# Patient Record
Sex: FEMALE | Race: WHITE | NOT HISPANIC OR LATINO | Employment: UNEMPLOYED | URBAN - METROPOLITAN AREA
[De-identification: names, ages, dates, MRNs, and addresses within clinical notes are randomized per-mention and may not be internally consistent; named-entity substitution may affect disease eponyms.]

---

## 2017-12-27 ENCOUNTER — APPOINTMENT (INPATIENT)
Dept: NON INVASIVE DIAGNOSTICS | Facility: HOSPITAL | Age: 62
DRG: 065 | End: 2017-12-27
Payer: MEDICARE

## 2017-12-27 ENCOUNTER — APPOINTMENT (INPATIENT)
Dept: MRI IMAGING | Facility: HOSPITAL | Age: 62
DRG: 065 | End: 2017-12-27
Payer: MEDICARE

## 2017-12-27 ENCOUNTER — APPOINTMENT (EMERGENCY)
Dept: CT IMAGING | Facility: HOSPITAL | Age: 62
DRG: 065 | End: 2017-12-27
Payer: MEDICARE

## 2017-12-27 ENCOUNTER — HOSPITAL ENCOUNTER (INPATIENT)
Facility: HOSPITAL | Age: 62
LOS: 2 days | Discharge: HOME | DRG: 065 | End: 2017-12-29
Attending: INTERNAL MEDICINE | Admitting: INTERNAL MEDICINE
Payer: MEDICARE

## 2017-12-27 DIAGNOSIS — R29.898 WEAKNESS OF LEFT ARM: Primary | ICD-10-CM

## 2017-12-27 DIAGNOSIS — R20.0 NUMBNESS: ICD-10-CM

## 2017-12-27 DIAGNOSIS — E11.9 TYPE 2 DIABETES MELLITUS (HCC): ICD-10-CM

## 2017-12-27 DIAGNOSIS — E66.01 MORBID OBESITY (HCC): ICD-10-CM

## 2017-12-27 DIAGNOSIS — I63.9 CEREBROVASCULAR ACCIDENT (CVA), UNSPECIFIED MECHANISM (HCC): ICD-10-CM

## 2017-12-27 PROBLEM — I10 HYPERTENSION: Status: ACTIVE | Noted: 2017-12-27

## 2017-12-27 PROBLEM — J44.9 COPD (CHRONIC OBSTRUCTIVE PULMONARY DISEASE) (HCC): Status: ACTIVE | Noted: 2017-12-27

## 2017-12-27 PROBLEM — E03.9 HYPOTHYROIDISM: Status: ACTIVE | Noted: 2017-12-27

## 2017-12-27 PROBLEM — E78.5 HYPERLIPIDEMIA: Status: ACTIVE | Noted: 2017-12-27

## 2017-12-27 LAB
ALBUMIN SERPL BCP-MCNC: 3.2 G/DL (ref 3.5–5)
ALP SERPL-CCNC: 132 U/L (ref 46–116)
ALT SERPL W P-5'-P-CCNC: 30 U/L (ref 12–78)
ANION GAP SERPL CALCULATED.3IONS-SCNC: 6 MMOL/L (ref 4–13)
APTT PPP: 32 SECONDS (ref 23–35)
AST SERPL W P-5'-P-CCNC: 25 U/L (ref 5–45)
ATRIAL RATE: 85 BPM
BACTERIA UR QL AUTO: ABNORMAL /HPF
BASOPHILS # BLD AUTO: 0.02 THOUSANDS/ΜL (ref 0–0.1)
BASOPHILS NFR BLD AUTO: 0 % (ref 0–1)
BILIRUB SERPL-MCNC: 0.4 MG/DL (ref 0.2–1)
BILIRUB UR QL STRIP: NEGATIVE
BUN SERPL-MCNC: 12 MG/DL (ref 5–25)
CALCIUM SERPL-MCNC: 9.2 MG/DL (ref 8.3–10.1)
CHLORIDE SERPL-SCNC: 104 MMOL/L (ref 100–108)
CLARITY UR: CLEAR
CO2 SERPL-SCNC: 31 MMOL/L (ref 21–32)
COLOR UR: YELLOW
CREAT SERPL-MCNC: 0.89 MG/DL (ref 0.6–1.3)
EOSINOPHIL # BLD AUTO: 0.13 THOUSAND/ΜL (ref 0–0.61)
EOSINOPHIL NFR BLD AUTO: 2 % (ref 0–6)
ERYTHROCYTE [DISTWIDTH] IN BLOOD BY AUTOMATED COUNT: 15.5 % (ref 11.6–15.1)
GFR SERPL CREATININE-BSD FRML MDRD: 70 ML/MIN/1.73SQ M
GLUCOSE SERPL-MCNC: 209 MG/DL (ref 65–140)
GLUCOSE SERPL-MCNC: 234 MG/DL (ref 65–140)
GLUCOSE UR STRIP-MCNC: NEGATIVE MG/DL
HCT VFR BLD AUTO: 38.9 % (ref 34.8–46.1)
HGB BLD-MCNC: 11.5 G/DL (ref 11.5–15.4)
HGB UR QL STRIP.AUTO: NEGATIVE
INR PPP: 0.94 (ref 0.86–1.16)
KETONES UR STRIP-MCNC: NEGATIVE MG/DL
LEUKOCYTE ESTERASE UR QL STRIP: NEGATIVE
LYMPHOCYTES # BLD AUTO: 1.27 THOUSANDS/ΜL (ref 0.6–4.47)
LYMPHOCYTES NFR BLD AUTO: 18 % (ref 14–44)
MCH RBC QN AUTO: 23.8 PG (ref 26.8–34.3)
MCHC RBC AUTO-ENTMCNC: 29.6 G/DL (ref 31.4–37.4)
MCV RBC AUTO: 80 FL (ref 82–98)
MONOCYTES # BLD AUTO: 0.65 THOUSAND/ΜL (ref 0.17–1.22)
MONOCYTES NFR BLD AUTO: 9 % (ref 4–12)
NEUTROPHILS # BLD AUTO: 4.87 THOUSANDS/ΜL (ref 1.85–7.62)
NEUTS SEG NFR BLD AUTO: 70 % (ref 43–75)
NITRITE UR QL STRIP: POSITIVE
NON-SQ EPI CELLS URNS QL MICRO: ABNORMAL /HPF
NRBC BLD AUTO-RTO: 0 /100 WBCS
P AXIS: 72 DEGREES
PH UR STRIP.AUTO: 6 [PH] (ref 4.5–8)
PLATELET # BLD AUTO: 237 THOUSANDS/UL (ref 149–390)
PLATELET # BLD AUTO: 261 THOUSANDS/UL (ref 149–390)
PMV BLD AUTO: 10.3 FL (ref 8.9–12.7)
PMV BLD AUTO: 10.7 FL (ref 8.9–12.7)
POTASSIUM SERPL-SCNC: 4.8 MMOL/L (ref 3.5–5.3)
PR INTERVAL: 188 MS
PROT SERPL-MCNC: 6.8 G/DL (ref 6.4–8.2)
PROT UR STRIP-MCNC: NEGATIVE MG/DL
PROTHROMBIN TIME: 12.8 SECONDS (ref 12.1–14.4)
QRS AXIS: 72 DEGREES
QRSD INTERVAL: 76 MS
QT INTERVAL: 360 MS
QTC INTERVAL: 428 MS
RBC # BLD AUTO: 4.84 MILLION/UL (ref 3.81–5.12)
RBC #/AREA URNS AUTO: ABNORMAL /HPF
SODIUM SERPL-SCNC: 141 MMOL/L (ref 136–145)
SP GR UR STRIP.AUTO: 1.01 (ref 1–1.03)
T WAVE AXIS: 63 DEGREES
TROPONIN I SERPL-MCNC: <0.02 NG/ML
TSH SERPL DL<=0.05 MIU/L-ACNC: 1.49 UIU/ML (ref 0.36–3.74)
UROBILINOGEN UR QL STRIP.AUTO: 0.2 E.U./DL
VENTRICULAR RATE: 85 BPM
WBC # BLD AUTO: 6.98 THOUSAND/UL (ref 4.31–10.16)
WBC #/AREA URNS AUTO: ABNORMAL /HPF

## 2017-12-27 PROCEDURE — 85610 PROTHROMBIN TIME: CPT | Performed by: NURSE PRACTITIONER

## 2017-12-27 PROCEDURE — 81001 URINALYSIS AUTO W/SCOPE: CPT | Performed by: GENERAL PRACTICE

## 2017-12-27 PROCEDURE — 80053 COMPREHEN METABOLIC PANEL: CPT | Performed by: INTERNAL MEDICINE

## 2017-12-27 PROCEDURE — 84484 ASSAY OF TROPONIN QUANT: CPT | Performed by: INTERNAL MEDICINE

## 2017-12-27 PROCEDURE — 84443 ASSAY THYROID STIM HORMONE: CPT | Performed by: GENERAL PRACTICE

## 2017-12-27 PROCEDURE — 93306 TTE W/DOPPLER COMPLETE: CPT

## 2017-12-27 PROCEDURE — 99285 EMERGENCY DEPT VISIT HI MDM: CPT

## 2017-12-27 PROCEDURE — 93005 ELECTROCARDIOGRAM TRACING: CPT | Performed by: INTERNAL MEDICINE

## 2017-12-27 PROCEDURE — 85025 COMPLETE CBC W/AUTO DIFF WBC: CPT | Performed by: INTERNAL MEDICINE

## 2017-12-27 PROCEDURE — 85730 THROMBOPLASTIN TIME PARTIAL: CPT | Performed by: NURSE PRACTITIONER

## 2017-12-27 PROCEDURE — 36415 COLL VENOUS BLD VENIPUNCTURE: CPT | Performed by: GENERAL PRACTICE

## 2017-12-27 PROCEDURE — 85049 AUTOMATED PLATELET COUNT: CPT | Performed by: GENERAL PRACTICE

## 2017-12-27 PROCEDURE — 70450 CT HEAD/BRAIN W/O DYE: CPT

## 2017-12-27 PROCEDURE — 93005 ELECTROCARDIOGRAM TRACING: CPT

## 2017-12-27 PROCEDURE — 70551 MRI BRAIN STEM W/O DYE: CPT

## 2017-12-27 PROCEDURE — 36415 COLL VENOUS BLD VENIPUNCTURE: CPT

## 2017-12-27 PROCEDURE — 70544 MR ANGIOGRAPHY HEAD W/O DYE: CPT

## 2017-12-27 PROCEDURE — 82948 REAGENT STRIP/BLOOD GLUCOSE: CPT

## 2017-12-27 RX ORDER — SIMVASTATIN 40 MG
40 TABLET ORAL
COMMUNITY
End: 2020-07-16 | Stop reason: SDUPTHER

## 2017-12-27 RX ORDER — ATORVASTATIN CALCIUM 40 MG/1
40 TABLET, FILM COATED ORAL EVERY EVENING
Status: DISCONTINUED | OUTPATIENT
Start: 2017-12-27 | End: 2017-12-29 | Stop reason: HOSPADM

## 2017-12-27 RX ORDER — POTASSIUM CHLORIDE 20 MEQ/1
20 TABLET, EXTENDED RELEASE ORAL DAILY
COMMUNITY

## 2017-12-27 RX ORDER — HYDRALAZINE HYDROCHLORIDE 20 MG/ML
10 INJECTION INTRAMUSCULAR; INTRAVENOUS EVERY 6 HOURS PRN
Status: DISCONTINUED | OUTPATIENT
Start: 2017-12-27 | End: 2017-12-29 | Stop reason: HOSPADM

## 2017-12-27 RX ORDER — MELATONIN
1000 DAILY
COMMUNITY

## 2017-12-27 RX ORDER — ASPIRIN 81 MG/1
81 TABLET ORAL DAILY
COMMUNITY
End: 2017-12-29 | Stop reason: HOSPADM

## 2017-12-27 RX ORDER — MELOXICAM 7.5 MG/1
7.5 TABLET ORAL DAILY
COMMUNITY
End: 2019-05-21 | Stop reason: ALTCHOICE

## 2017-12-27 RX ORDER — GEMFIBROZIL 600 MG/1
600 TABLET, FILM COATED ORAL
COMMUNITY

## 2017-12-27 RX ORDER — BISOPROLOL FUMARATE AND HYDROCHLOROTHIAZIDE 10; 6.25 MG/1; MG/1
1 TABLET ORAL DAILY
COMMUNITY
End: 2018-02-27

## 2017-12-27 RX ORDER — BISOPROLOL FUMARATE AND HYDROCHLOROTHIAZIDE 10; 6.25 MG/1; MG/1
1 TABLET ORAL DAILY
Status: DISCONTINUED | OUTPATIENT
Start: 2017-12-27 | End: 2017-12-27 | Stop reason: SDUPTHER

## 2017-12-27 RX ORDER — ASPIRIN 81 MG/1
81 TABLET, CHEWABLE ORAL DAILY
Status: DISCONTINUED | OUTPATIENT
Start: 2017-12-27 | End: 2017-12-27

## 2017-12-27 RX ORDER — POTASSIUM CHLORIDE 20 MEQ/1
20 TABLET, EXTENDED RELEASE ORAL DAILY
Status: DISCONTINUED | OUTPATIENT
Start: 2017-12-27 | End: 2017-12-29 | Stop reason: HOSPADM

## 2017-12-27 RX ORDER — LEVOTHYROXINE SODIUM 0.15 MG/1
150 TABLET ORAL DAILY
Status: DISCONTINUED | OUTPATIENT
Start: 2017-12-27 | End: 2017-12-29 | Stop reason: HOSPADM

## 2017-12-27 RX ORDER — GLIMEPIRIDE 4 MG/1
4 TABLET ORAL
COMMUNITY
End: 2018-02-27

## 2017-12-27 RX ORDER — ALBUTEROL SULFATE 90 UG/1
2 AEROSOL, METERED RESPIRATORY (INHALATION) EVERY 6 HOURS PRN
COMMUNITY

## 2017-12-27 RX ORDER — ASPIRIN 81 MG/1
324 TABLET, CHEWABLE ORAL DAILY
Status: DISCONTINUED | OUTPATIENT
Start: 2017-12-28 | End: 2017-12-27

## 2017-12-27 RX ORDER — FUROSEMIDE 20 MG/1
20 TABLET ORAL 2 TIMES DAILY
Status: DISCONTINUED | OUTPATIENT
Start: 2017-12-27 | End: 2017-12-29 | Stop reason: HOSPADM

## 2017-12-27 RX ORDER — UREA 10 %
500 LOTION (ML) TOPICAL 2 TIMES DAILY
Status: DISCONTINUED | OUTPATIENT
Start: 2017-12-27 | End: 2017-12-29 | Stop reason: HOSPADM

## 2017-12-27 RX ORDER — ALBUTEROL SULFATE 90 UG/1
2 AEROSOL, METERED RESPIRATORY (INHALATION) EVERY 6 HOURS PRN
Status: DISCONTINUED | OUTPATIENT
Start: 2017-12-27 | End: 2017-12-29 | Stop reason: HOSPADM

## 2017-12-27 RX ORDER — ASPIRIN 325 MG
325 TABLET ORAL DAILY
Status: DISCONTINUED | OUTPATIENT
Start: 2017-12-27 | End: 2017-12-29 | Stop reason: HOSPADM

## 2017-12-27 RX ORDER — MELATONIN
1000 DAILY
Status: DISCONTINUED | OUTPATIENT
Start: 2017-12-27 | End: 2017-12-29 | Stop reason: HOSPADM

## 2017-12-27 RX ORDER — FLUTICASONE PROPIONATE 50 MCG
1 SPRAY, SUSPENSION (ML) NASAL DAILY PRN
COMMUNITY

## 2017-12-27 RX ORDER — LEVOTHYROXINE SODIUM 0.12 MG/1
125 TABLET ORAL DAILY
COMMUNITY

## 2017-12-27 RX ORDER — FAMOTIDINE 20 MG/1
20 TABLET, FILM COATED ORAL 2 TIMES DAILY
Status: DISCONTINUED | OUTPATIENT
Start: 2017-12-27 | End: 2017-12-29 | Stop reason: HOSPADM

## 2017-12-27 RX ORDER — HEPARIN SODIUM 5000 [USP'U]/ML
5000 INJECTION, SOLUTION INTRAVENOUS; SUBCUTANEOUS EVERY 8 HOURS SCHEDULED
Status: DISCONTINUED | OUTPATIENT
Start: 2017-12-27 | End: 2017-12-29 | Stop reason: HOSPADM

## 2017-12-27 RX ORDER — ONDANSETRON 2 MG/ML
4 INJECTION INTRAMUSCULAR; INTRAVENOUS EVERY 6 HOURS PRN
Status: DISCONTINUED | OUTPATIENT
Start: 2017-12-27 | End: 2017-12-29 | Stop reason: HOSPADM

## 2017-12-27 RX ORDER — HYDROCHLOROTHIAZIDE 12.5 MG/1
6.25 TABLET ORAL DAILY
Status: DISCONTINUED | OUTPATIENT
Start: 2017-12-27 | End: 2017-12-29 | Stop reason: HOSPADM

## 2017-12-27 RX ORDER — FUROSEMIDE 20 MG/1
20 TABLET ORAL DAILY PRN
COMMUNITY

## 2017-12-27 RX ORDER — RANITIDINE 150 MG/1
150 TABLET ORAL 2 TIMES DAILY
COMMUNITY

## 2017-12-27 RX ORDER — ACETAMINOPHEN 325 MG/1
650 TABLET ORAL EVERY 4 HOURS PRN
Status: DISCONTINUED | OUTPATIENT
Start: 2017-12-27 | End: 2017-12-29 | Stop reason: HOSPADM

## 2017-12-27 RX ORDER — BISOPROLOL FUMARATE 5 MG/1
10 TABLET ORAL DAILY
Status: DISCONTINUED | OUTPATIENT
Start: 2017-12-27 | End: 2017-12-29 | Stop reason: HOSPADM

## 2017-12-27 RX ORDER — MONTELUKAST SODIUM 10 MG/1
10 TABLET ORAL
Status: DISCONTINUED | OUTPATIENT
Start: 2017-12-27 | End: 2017-12-29 | Stop reason: HOSPADM

## 2017-12-27 RX ORDER — MONTELUKAST SODIUM 10 MG/1
10 TABLET ORAL
COMMUNITY

## 2017-12-27 RX ORDER — UREA 10 %
500 LOTION (ML) TOPICAL 2 TIMES DAILY
COMMUNITY

## 2017-12-27 RX ADMIN — Medication 500 MG: at 18:51

## 2017-12-27 RX ADMIN — ASPIRIN 325 MG: 325 TABLET ORAL at 16:07

## 2017-12-27 RX ADMIN — FAMOTIDINE 20 MG: 20 TABLET, FILM COATED ORAL at 18:51

## 2017-12-27 RX ADMIN — INSULIN LISPRO 1 UNITS: 100 INJECTION, SOLUTION INTRAVENOUS; SUBCUTANEOUS at 21:49

## 2017-12-27 RX ADMIN — MONTELUKAST SODIUM 10 MG: 10 TABLET, FILM COATED ORAL at 21:48

## 2017-12-27 RX ADMIN — ATORVASTATIN CALCIUM 40 MG: 40 TABLET, FILM COATED ORAL at 18:51

## 2017-12-27 RX ADMIN — FLUTICASONE PROPIONATE AND SALMETEROL 1 PUFF: 50; 250 POWDER RESPIRATORY (INHALATION) at 21:49

## 2017-12-27 RX ADMIN — FUROSEMIDE 20 MG: 20 TABLET ORAL at 18:51

## 2017-12-27 RX ADMIN — HEPARIN SODIUM 5000 UNITS: 5000 INJECTION, SOLUTION INTRAVENOUS; SUBCUTANEOUS at 21:48

## 2017-12-27 RX ADMIN — HEPARIN SODIUM 5000 UNITS: 5000 INJECTION, SOLUTION INTRAVENOUS; SUBCUTANEOUS at 14:48

## 2017-12-27 NOTE — CONSULTS
Consultation - Neurology   Lupe Walter 58 y o  female MRN: [id number]  Unit/Bed#: ED 07 Encounter: [id number]      Physician Requesting Consult: Leandra Harrison MD  Reason for Consult:  Left arm weakness  Hx and PE limited by:  None    HPI: Lupe Walter is a right handed  58 y o  female who  was in her usual state of health until the middle of the night last night she woke up and felt her left upper extremity felt weak and numb  The patient thought she slept on 1 side and decided to go back to sleep  Patient woke up this morning with persistent symptoms of weakness and numbness affecting the left upper extremity not associated with any facial weakness or left lower extremity weakness  The patient also denies any headaches blurred vision diplopia perioral numbness vertigo or dizziness or any speech difficulty or gait dysfunction  No history of any similar symptoms in the past patient decided to come to the emergency room today for further evaluation of the left arm numbness and weakness  At baseline she has a history of hypertension diabetes and hyperlipidemia does not smoke or drink  She is disabled from COPD  Monia Le Review of Systems:  See history of present illness for pertinent neurological symptoms  All other systems are negative  Historical Information   Past Medical History:   Diagnosis Date    [facility]     [facility] [place]     Disease of thyroid gland     Hyperlipidemia     Hypertension      Past Surgical History:   Procedure Laterality Date    APPENDECTOMY      CHOLECYSTECTOMY      HYSTERECTOMY      TONSILLECTOMY       Social History   History   Smoking Status    Former Smoker   Smokeless Tobacco    Never Used     History   Alcohol Use No     History   Drug Use No       Family History:   History reviewed  No pertinent family history      No Known Allergies    Meds:  All current active meds have been reviewed    Scheduled Meds:  aspirin 325 mg Oral Daily   atorvastatin 40 mg Oral QPM   bisoprolol 10 mg Oral Daily   And      hydrochlorothiazide 6 25 mg Oral Daily   cholecalciferol 1,000 Units Oral Daily   famotidine 20 mg Oral BID   fluticasone-salmeterol 1 puff Inhalation Q12H STUART   furosemide 20 mg Oral BID   heparin (porcine) 5,000 Units Subcutaneous Q8H [address]   insulin lispro 1-5 Units Subcutaneous TID AC   insulin lispro 1-5 Units Subcutaneous HS   levothyroxine 150 mcg Oral Daily   Liraglutide 1 2 mg Subcutaneous Daily   magnesium gluconate 500 mg Oral BID   montelukast 10 mg Oral HS   potassium chloride 20 mEq Oral Daily     PRN Meds:   acetaminophen    albuterol    hydrALAZINE    ondansetron    Physical Exam:   Objective   Vitals:   Vitals:    12/27/17 1626   BP: (!) 175/82   Pulse: 81   Resp: 16   Temp:    SpO2: 99%   ,Body mass index is 39 27 kg/m²  Patient was examined in emergency department bed  General appearance: Cooperative in no acute distress  Head & neck head is atraumatic and normocephalic  Neck is supple with full range of motion  Cardiovascular: Carotid arteriesno carotid bruits  Neurologic:   Patient is alert awake oriented, high functions are intact, speech is fluent  No evidence of any aphasia or dysarthria  Cranial nerve examination reveals visual fields are full to threat, pupils equal and reactive, extraocular movements intact, fundi showed sharp disc margins, sensation in the V1 V2 V3 distribution is symmetric, no obvious facial asymmetry noted, tongue is midline and gag is adequate  Motor examination reveals normal tone and bulk, patient has evidence of a left upper extremity pronator drift with diminished fine finger movements, minimal weakness is noted in the left triceps, 5-/5 range, strength in the lower extremities is preserved  Deep tendon reflexes are hypoactive throughout and toes are bilaterally upgoing      Sensory examination to pinprick light touch proprioception and vibration is preserved bilaterally, patient does not extinguish double simultaneous stimuli  Coordination mild dysmetria is noted on left finger-to-nose examination in proportion to the motor weakness  Gait could not be evaluated  Lab Results:Lab Results:   I have personally reviewed pertinent reports  , CBC:   Results from last 7 days  Lab Units 12/27/17  1416 12/27/17  1037   WBC Thousand/uL  --  6 98   RBC Million/uL  --  4 84   HEMOGLOBIN g/dL  --  11 5   HEMATOCRIT %  --  38 9   MCV fL  --  80*   PLATELETS Thousands/uL 237 261   , BMP/CMP:   Results from last 7 days  Lab Units 12/27/17  1037   SODIUM mmol/L 141   POTASSIUM mmol/L 4 8   CHLORIDE mmol/L 104   CO2 mmol/L 31   ANION GAP mmol/L 6   BUN mg/dL 12   CREATININE mg/dL 0 89   GLUCOSE RANDOM mg/dL 234*   CALCIUM mg/dL 9 2   AST U/L 25   ALT U/L 30   ALK PHOS U/L 132*   TOTAL PROTEIN g/dL 6 8   ALBUMIN g/dL 3 2*   BILIRUBIN TOTAL mg/dL 0 40   EGFR ml/min/1 73sq m 70     I have personally reviewed pertinent reports  EEG, Echo, Pathology, and Other Studies: I have personally reviewed pertinent films in PACS    Family, was present at the bedside for history and examination  Assessment:  1  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, secondary to small vessel disease  2  Hypertension  3  Diabetes mellitus  4  Hyperlipidemia  Plan:  Patient had a CT scan of the brain initially which was unremarkable and then followed with an MRI of the brain which showed evidence of tiny diffusion defects affecting the precentral and postcentral cortex, MRA of the brain showed no evidence of any significant stenosis or any intracranial aneurysms  Patient has been admitted and started on stroke pathway, would advised to increase the dose of aspirin to 325 mg daily, rehab will be initiated, carotid ultrasound 2D echo will be requested  Neuro checks to be carried out will follow up this patient with you        Angelito Davidson MD  [id number]/[id number],7:38 PM    "This note has been constructed using a voice recognition system "

## 2017-12-27 NOTE — H&P
History and Physical - [facility] Internal Medicine    Patient Information: Chastity Olivarez 58 y o  female MRN: [id number]  Unit/Bed#: ED 07 Encounter: [id number]  Admitting Physician: Chris Lindsey MD  PCP: Lor Barry  Date of Admission:  12/27/17    Assessment/Plan:    Hospital Problem List:     Principal Problem:    Numbness  Active Problems:    Type 2 diabetes mellitus ([facility] )    Hypertension    Hyperlipidemia    COPD (chronic obstructive pulmonary disease) ([facility] )    Hypothyroidism      Plan for the Primary Problem(s):    Principal Problem:    Numbness-r/o cva-neurology consult; mri/mra with acute lacunar infarcts-d/w neurology; increase asa to 325mg po daily; echo pending  Active Problems:    Type 2 diabetes mellitus (HCC)-check a1c and ss coverage    Hypertension-continue outpatient meds and hydralazine    Hyperlipidemia-on lopid as outpatient; change to lipitor    COPD (chronic obstructive pulmonary disease) ([facility])-not in exacerbation-continue mdi;s    Hypothyroidism-on replacement and check tsh      VTE Prophylaxis: Heparin  / sequential compression device   Code Status: full  POLST: POLST is not applicable to this patient    Anticipated Length of Stay:  Patient will be admitted on an Inpatient basis with an anticipated length of stay of  > 2 midnights  Justification for Hospital Stay: cva    Total Time for Visit, including Counseling / Coordination of Care: 30 minutes  Greater than 50% of this total time spent on direct patient counseling and coordination of care  Chief Complaint:   Left arm numbness    History of Present Illness:    Chastity Olivarez is a 58 y o  female who presents with left arm numbness  Patient says she went to bed feeling well last evening awoke overnight thinking she slept on her arm causing numbness and fell back to sleep and then she woke again this morning with left arm numbness and some clumsiness    She has no other associated symptoms she denies any chest pain or shortness of breath specifically  She denies history of stroke but she is diabetic  Review of Systems:    Review of Systems   Respiratory: Negative for cough and chest tightness  Cardiovascular: Negative for chest pain and leg swelling  Gastrointestinal: Positive for abdominal pain  Genitourinary: Negative for dyspareunia  Neurological: Positive for numbness  Negative for dizziness, facial asymmetry, light-headedness and headaches  Past Medical and Surgical History:     Past Medical History:   Diagnosis Date    [facility] )     Diabetes mellitus ([place] [address] )     Disease of thyroid gland     Hyperlipidemia     Hypertension        Past Surgical History:   Procedure Laterality Date    APPENDECTOMY      CHOLECYSTECTOMY      HYSTERECTOMY      TONSILLECTOMY         Meds/Allergies:    Prior to Admission medications    Not on File     I have reviewed home medications with patient personally  Allergies: No Known Allergies    Social History:     Marital Status:    Occupation:   Patient Pre-hospital Living Situation:   Patient Pre-hospital Level of Mobility:   Patient Pre-hospital Diet Restrictions:   Substance Use History:   History   Alcohol Use No     History   Smoking Status    Former Smoker   Smokeless Tobacco    Never Used     History   Drug Use No       Family History:    non-contributory    Physical Exam:     Vitals:   Blood Pressure: (!) 189/91 (12/27/17 1418)  Pulse: 72 (12/27/17 1418)  Temperature: 97 9 °F (36 6 °C) (12/27/17 1003)  Temp Source: Oral (12/27/17 1003)  Respirations: 15 (12/27/17 1418)  Height: 5' 5" (165 1 cm) (12/27/17 1003)  Weight - Scale: 107 kg (236 lb) (12/27/17 1003)  SpO2: 99 % (12/27/17 1418)    Physical Exam   Constitutional: She is oriented to person, place, and time  She appears well-developed and well-nourished  HENT:   Head: Normocephalic and atraumatic     Eyes: Conjunctivae are normal  Pupils are equal, round, and reactive to light  Pulmonary/Chest: Effort normal and breath sounds normal    Abdominal: Soft  Bowel sounds are normal    Musculoskeletal: She exhibits no edema  Neurological: She is alert and oriented to person, place, and time  No cranial nerve deficit  Coordination normal        Additional Data:     Lab Results: I have personally reviewed pertinent reports  Results from last 7 days  Lab Units 12/27/17  1416 12/27/17  1037   WBC Thousand/uL  --  6 98   HEMOGLOBIN g/dL  --  11 5   HEMATOCRIT %  --  38 9   PLATELETS Thousands/uL 237 261   NEUTROS PCT %  --  70   LYMPHS PCT %  --  18   MONOS PCT %  --  9   EOS PCT %  --  2       Results from last 7 days  Lab Units 12/27/17  1037   SODIUM mmol/L 141   POTASSIUM mmol/L 4 8   CHLORIDE mmol/L 104   CO2 mmol/L 31   BUN mg/dL 12   CREATININE mg/dL 0 89   CALCIUM mg/dL 9 2   TOTAL PROTEIN g/dL 6 8   BILIRUBIN TOTAL mg/dL 0 40   ALK PHOS U/L 132*   ALT U/L 30   AST U/L 25   GLUCOSE RANDOM mg/dL 234*       Results from last 7 days  Lab Units 12/27/17  1037   INR  0 94       Imaging: I have personally reviewed pertinent reports  Ct Head Without Contrast    Result Date: 12/27/2017  Narrative: CT BRAIN - WITHOUT CONTRAST INDICATION:  Left arm weakness  COMPARISON:  None TECHNIQUE:  CT examination of the brain was performed  In addition to axial images, coronal reformatted images were created and submitted for interpretation  Radiation dose length product (DLP) for this visit:  990 mGy-cm   This examination, like all CT scans performed in the [facility], was performed utilizing techniques to minimize radiation dose exposure, including the use of iterative reconstruction and automated exposure control  IMAGE QUALITY:  Diagnostic  FINDINGS:  PARENCHYMA:  No intracranial mass, mass effect or midline shift  No CT signs of acute infarction  There is no parenchymal hemorrhage  Mild cerebral atrophy    Calcifications bilateral cavernous carotid arteries  VENTRICLES AND EXTRA-AXIAL SPACES:  Normal for patient's age  VISUALIZED ORBITS AND PARANASAL SINUSES:  Unremarkable  CALVARIUM AND EXTRACRANIAL SOFT TISSUES:   Normal      Impression: No acute intracranial abnormality  Workstation performed: [id number]       EKG, Pathology, and Other Studies Reviewed on Admission:   · EKG:     [employer] Records Reviewed: No     ** Please Note: Dragon 360 Dictation voice to text software may have been used in the creation of this document   **

## 2017-12-27 NOTE — ED NOTES
Dr Michelle Mcpherson with neurology at bedside for evaluation       Shirley Rooney RN  12/27/17 9502

## 2017-12-27 NOTE — ED PROVIDER NOTES
History  Chief Complaint   Patient presents with    Numbness     pt presents with left arm, numbness, states it began in the middle of the night, no other complaints at this time      66-year-old female presents here with a chief complaint of left arm weakness and numbness  She states that she woke up in the middle the night had noticed that her left arm was heavy feeling  She paid no mind to it and thought she was sleeping wrong on her arm  Now when she wakes up completely she presents here because the weakness continues  She finds it difficult to describe but she describes this as a heaviness  She denies any headache no nausea no vomiting no diaphoresis no fever no chills  She has never had an episode like this before  Nothing seems to make her symptoms worse  Symptoms are concerning for acute CVA  Patient falls out of the tPA window because of duration of symptoms  CVA/TIA-like Symptoms   Presenting symptoms: incoordination, sensory loss and weakness    Presenting symptoms: no change in level of consciousness, no confusion, no headaches, no language symptoms, no loss of balance and no visual change    Date/time of last known well:  12/26/2017 11:00 PM  Onset quality:  Unable to specify  Timing:  Constant  Progression:  Unable to specify  Similar to previous episodes: no    Associated symptoms: paresthesias    Associated symptoms: no chest pain, no trouble swallowing, no dizziness, no facial pain, no fall, no fever, no nausea and no vomiting        Prior to Admission Medications   Prescriptions Last Dose Informant Patient Reported? Taking?    Liraglutide (VICTOZA) 18 MG/3ML SOPN 12/26/2017 at Unknown time  Yes Yes   Sig: Inject 1 2 mg under the skin     albuterol (PROVENTIL HFA,VENTOLIN HFA) 90 mcg/act inhaler Past Week at Unknown time  Yes Yes   Sig: Inhale 2 puffs every 6 (six) hours as needed for wheezing   aspirin (ECOTRIN LOW STRENGTH) 81 mg EC tablet 12/27/2017 at Unknown time  Yes Yes Sig: Take 81 mg by mouth daily   bisoprolol-hydrochlorothiazide (ZIAC) 10-6 25 MG per tablet 2017 at Unknown time  Yes Yes   Sig: Take 1 tablet by mouth daily   cholecalciferol (VITAMIN D3) 1,000 units tablet 2017 at Unknown time  Yes Yes   Sig: Take 1,000 Units by mouth daily   fluticasone (FLONASE) 50 mcg/act nasal spray More than a month at Unknown time  Yes No   Si spray into each nostril daily   fluticasone (FLOVENT DISKUS) 50 MCG/BLIST diskus inhaler 2017 at Unknown time  Yes Yes   Sig: Inhale 1 puff 2 (two) times a day   fluticasone-salmeterol (ADVAIR) 250-50 mcg/dose inhaler 2017 at Unknown time  Yes Yes   Sig: Inhale 1 puff every 12 (twelve) hours   furosemide (LASIX) 20 mg tablet 2017 at Unknown time  Yes Yes   Sig: Take 20 mg by mouth 2 (two) times a day   gemfibrozil (LOPID) 600 mg tablet 2017 at Unknown time  Yes Yes   Sig: Take 600 mg by mouth 2 (two) times a day before meals   glimepiride (AMARYL) 4 mg tablet Unknown at Unknown time  Yes No   Sig: Take 4 mg by mouth every morning before breakfast   levothyroxine 150 mcg tablet 2017 at Unknown time  Yes Yes   Sig: Take 150 mcg by mouth daily   magnesium gluconate (MAGONATE) 500 mg tablet 2017 at Unknown time  Yes Yes   Sig: Take 500 mg by mouth 2 (two) times a day   meloxicam (MOBIC) 7 5 mg tablet 2017 at Unknown time  Yes Yes   Sig: Take 7 5 mg by mouth daily   metFORMIN (GLUCOPHAGE) 1000 MG tablet 2017 at Unknown time  Yes Yes   Sig: Take 1,000 mg by mouth 2 (two) times a day with meals   montelukast (SINGULAIR) 10 mg tablet 2017 at Unknown time  Yes Yes   Sig: Take 10 mg by mouth daily at bedtime   potassium chloride (K-DUR,KLOR-CON) 20 mEq tablet 2017 at Unknown time  Yes Yes   Sig: Take 20 mEq by mouth daily   ranitidine (ZANTAC) 150 mg tablet 2017 at Unknown time  Yes Yes   Sig: Take 150 mg by mouth 2 (two) times a day   simvastatin (ZOCOR) 40 mg tablet 2017 at Unknown time  Yes Yes   Sig: Take 40 mg by mouth daily at bedtime      Facility-Administered Medications: None       Past Medical History:   Diagnosis Date    COPD [facility]chronic obstructive pulmonary disease) ([facility] )     Diabetes mellitus ([facility] )     Disease of thyroid gland     Hyperlipidemia     Hypertension        Past Surgical History:   Procedure Laterality Date    APPENDECTOMY      CHOLECYSTECTOMY      HYSTERECTOMY      TONSILLECTOMY         History reviewed  No pertinent family history  I have reviewed and agree with the history as documented  Social History   Substance Use Topics    Smoking status: Former Smoker    Smokeless tobacco: Never Used    Alcohol use No        Review of Systems   Constitutional: Negative for activity change, fatigue and fever  HENT: Negative for congestion, ear pain, rhinorrhea, sore throat and trouble swallowing  Eyes: Negative  Respiratory: Negative for cough, shortness of breath and wheezing  Cardiovascular: Negative for chest pain  Gastrointestinal: Negative for abdominal pain, diarrhea, nausea and vomiting  Endocrine: Negative  Genitourinary: Negative for difficulty urinating, dyspareunia, dysuria, flank pain, frequency, menstrual problem, pelvic pain, urgency, vaginal bleeding, vaginal discharge and vaginal pain  Musculoskeletal: Negative for arthralgias and myalgias  Skin: Negative for color change and pallor  Neurological: Positive for weakness, disturbances in coordination and paresthesias  Negative for dizziness, speech difficulty, headaches and loss of balance  Hematological: Negative for adenopathy  Psychiatric/Behavioral: Negative for confusion         Physical Exam  ED Triage Vitals [12/27/17 1003]   Temperature Pulse Respirations Blood Pressure SpO2   97 9 °F (36 6 °C) 96 18 (!) 213/97 97 %      Temp Source Heart Rate Source Patient Position - Orthostatic VS BP Location FiO2 (%)   Oral Monitor Sitting Right arm --      Pain Score No Pain           Orthostatic Vital Signs  Vitals:    12/27/17 1215 12/27/17 1315 12/27/17 1418 12/27/17 1615   BP: (!) 207/100 139/79 (!) 189/91 (!) 175/82   Pulse: 74 75 72 81   Patient Position - Orthostatic VS: Lying Lying Lying Sitting       Physical Exam   Constitutional: She is oriented to person, place, and time  She appears well-developed and well-nourished  She is cooperative  Non-toxic appearance  She does not have a sickly appearance  She does not appear ill  No distress  HENT:   Head: Normocephalic and atraumatic  Right Ear: Tympanic membrane and external ear normal    Left Ear: Tympanic membrane and external ear normal    Nose: No rhinorrhea, sinus tenderness or nasal deformity  No epistaxis  Right sinus exhibits no maxillary sinus tenderness and no frontal sinus tenderness  Left sinus exhibits no maxillary sinus tenderness and no frontal sinus tenderness  Mouth/Throat: Oropharynx is clear and moist and mucous membranes are normal  Normal dentition  Eyes: EOM are normal  Pupils are equal, round, and reactive to light  Neck: Normal range of motion  Neck supple  Cardiovascular: Normal rate, regular rhythm and normal heart sounds  No murmur heard  Pulmonary/Chest: Effort normal and breath sounds normal  No accessory muscle usage  No respiratory distress  She has no wheezes  She has no rales  She exhibits no tenderness  Abdominal: Soft  She exhibits no distension  There is no guarding  Musculoskeletal: Normal range of motion  She exhibits no edema or tenderness  Lymphadenopathy:     She has no cervical adenopathy  Neurological: She is alert and oriented to person, place, and time  She displays no tremor  She exhibits normal muscle tone  Poor coordination of approximating the thumb and fingers of the left arm significantly compared to right  Skin: Skin is warm and dry  No rash noted  No erythema  Psychiatric: She has a normal mood and affect     Nursing note and vitals reviewed  ED Medications  Medications   ondansetron (ZOFRAN) injection 4 mg (not administered)   atorvastatin (LIPITOR) tablet 40 mg (not administered)   heparin (porcine) subcutaneous injection 5,000 Units (5,000 Units Subcutaneous Given 12/27/17 1448)   acetaminophen (TYLENOL) tablet 650 mg (not administered)   insulin lispro (HumaLOG) 100 units/mL subcutaneous injection 1-5 Units (not administered)   insulin lispro (HumaLOG) 100 units/mL subcutaneous injection 1-5 Units (not administered)   albuterol (PROVENTIL HFA,VENTOLIN HFA) inhaler 2 puff (not administered)   cholecalciferol (VITAMIN D3) tablet 1,000 Units (1,000 Units Oral Not Given 12/27/17 1448)   fluticasone-salmeterol (ADVAIR) 250-50 mcg/dose inhaler 1 puff (1 puff Inhalation Not Given 12/27/17 1449)   furosemide (LASIX) tablet 20 mg (not administered)   levothyroxine tablet 150 mcg (150 mcg Oral Not Given 12/27/17 1450)   Liraglutide SOPN 0 2 mL (not administered)   magnesium gluconate (MAGONATE) tablet 500 mg (not administered)   montelukast (SINGULAIR) tablet 10 mg (not administered)   potassium chloride (K-DUR,KLOR-CON) CR tablet 20 mEq (20 mEq Oral Not Given 12/27/17 1450)   famotidine (PEPCID) tablet 20 mg (not administered)   hydrALAZINE (APRESOLINE) injection 10 mg (not administered)   bisoprolol (ZEBETA) tablet 10 mg (10 mg Oral Not Given 12/27/17 1451)     And   hydrochlorothiazide (HYDRODIURIL) tablet 6 25 mg (6 25 mg Oral Not Given 12/27/17 1450)   aspirin tablet 325 mg (325 mg Oral Given 12/27/17 1607)       Diagnostic Studies  Results Reviewed     Procedure Component Value Units Date/Time    TSH, 3rd generation [[id number]]  (Normal) Collected:  12/27/17 1037    Lab Status:  Final result Specimen:  Blood from Arm, Right Updated:  12/27/17 1541     TSH 3RD GENERATON 1 493 uIU/mL     Narrative:         Patients undergoing fluorescein dye angiography may retain small amounts of fluorescein in the body for 48-72 hours post procedure  Samples containing fluorescein can produce falsely depressed TSH values  If the patient had this procedure,a specimen should be resubmitted post fluorescein clearance  The recommended reference ranges for TSH during pregnancy are as follows:  First trimester 0 1 to 2 5 uIU/mL  Second trimester  0 2 to 3 0 uIU/mL  Third trimester 0 3 to 3 0 uIU/m      Urine Microscopic [[id number]]  (Abnormal) Collected:  12/27/17 1412    Lab Status:  Final result Specimen:  Urine from Urine, Clean Catch Updated:  12/27/17 1439     RBC, UA None Seen /hpf      WBC, UA None Seen /hpf      Epithelial Cells Occasional /hpf      Bacteria, UA Innumerable (A) /hpf     Urinalysis with reflex to microscopic [[id number]]  (Abnormal) Collected:  12/27/17 1412    Lab Status:  Final result Specimen:  Urine from Urine, Clean Catch Updated:  12/27/17 1423     Color, UA Yellow     Clarity, UA Clear     Specific Gravity, UA 1 015     pH, UA 6 0     Leukocytes, UA Negative     Nitrite, UA Positive (A)     Protein, UA Negative mg/dl      Glucose, UA Negative mg/dl      Ketones, UA Negative mg/dl      Urobilinogen, UA 0 2 E U /dl      Bilirubin, UA Negative     Blood, UA Negative    Platelet count [[id number]]  (Normal) Collected:  12/27/17 1416    Lab Status:  Final result Specimen:  Blood from Arm, Right Updated:  12/27/17 1422     Platelets 892 Thousands/uL      MPV 10 7 fL     Troponin I [[id number]]  (Normal) Collected:  12/27/17 1037    Lab Status:  Final result Specimen:  Blood from Arm, Right Updated:  12/27/17 1101     Troponin I <0 02 ng/mL     Narrative:         [employer] Chemistry analyzer 99% cutoff is > 0 04 ng/mL in network labs    o cTnI 99% cutoff is useful only when applied to patients in the clinical setting of myocardial ischemia  o cTnI 99% cutoff should be interpreted in the context of clinical history, ECG findings and possibly cardiac imaging to establish correct diagnosis    o cTnI 99% cutoff may be suggestive but clearly not indicative of a coronary event without the clinical setting of myocardial ischemia  Comprehensive metabolic panel [[id number]]  (Abnormal) Collected:  12/27/17 1037    Lab Status:  Final result Specimen:  Blood from Arm, Right Updated:  12/27/17 1059     Sodium 141 mmol/L      Potassium 4 8 mmol/L      Chloride 104 mmol/L      CO2 31 mmol/L      Anion Gap 6 mmol/L      BUN 12 mg/dL      Creatinine 0 89 mg/dL      Glucose 234 (H) mg/dL      Calcium 9 2 mg/dL      AST 25 U/L      ALT 30 U/L      Alkaline Phosphatase 132 (H) U/L      Total Protein 6 8 g/dL      Albumin 3 2 (L) g/dL      Total Bilirubin 0 40 mg/dL      eGFR 70 ml/min/1 73sq m     Narrative:         National Kidney Disease Education Program recommendations are as follows:  GFR calculation is accurate only with a steady state creatinine  Chronic Kidney disease less than 60 ml/min/1 73 sq  meters  Kidney failure less than 15 ml/min/1 73 sq  meters  Xavier Bernal [[id number]]  (Normal) Collected:  12/27/17 1037    Lab Status:  Final result Specimen:  Blood from Arm, Right Updated:  12/27/17 1054     Protime 12 8 seconds      INR 0 94    APTT [[id number]]  (Normal) Collected:  12/27/17 1037    Lab Status:  Final result Specimen:  Blood from Arm, Right Updated:  12/27/17 1054     PTT 32 seconds     Narrative:          Therapeutic Heparin Range = 60-90 seconds    CBC and differential [[id number]]  (Abnormal) Collected:  12/27/17 1037    Lab Status:  Final result Specimen:  Blood from Arm, Right Updated:  12/27/17 1043     WBC 6 98 Thousand/uL      RBC 4 84 Million/uL      Hemoglobin 11 5 g/dL      Hematocrit 38 9 %      MCV 80 (L) fL      MCH 23 8 (L) pg      MCHC 29 6 (L) g/dL      RDW 15 5 (H) %      MPV 10 3 fL      Platelets 165 Thousands/uL      nRBC 0 /100 WBCs      Neutrophils Relative 70 %      Lymphocytes Relative 18 %      Monocytes Relative 9 %      Eosinophils Relative 2 %      Basophils Relative 0 %      Neutrophils Absolute 4 87 Thousands/µL Lymphocytes Absolute 1 27 Thousands/µL      Monocytes Absolute 0 65 Thousand/µL      Eosinophils Absolute 0 13 Thousand/µL      Basophils Absolute 0 02 Thousands/µL                  MRA and or MRV head wo contrast   Final Result by Gavin Lopes MD (12/27 1346)      No focal stenosis or saccular aneurysm within the [place]  Workstation performed: [id number]         MRI brain wo contrast   Final Result by Gavin Lopes MD (12/27 1344)      Small foci of acute ischemia at the right precentral and post central gyri  No acute intracranial hemorrhage  Mild scattered periventricular and subcortical foci of white matter T2 hyperintensity which is nonspecific and most likely related to chronic small vessel ischemic changes  Other less likely etiologies include demyelinating disease, vasculitis, Lyme and    migraines  I personally discussed this study with DR Homa Toro on 12/27/2017 1:24 PM          Workstation performed: [id number]         CT head without contrast   Final Result by Rosalinda Lynn DO (12/27 1106)      No acute intracranial abnormality           Workstation performed: [id number]         VAS carotid complete study    (Results Pending)              Procedures  Procedures       Phone Contacts  ED Phone Contact    ED Course  ED Course              NIH Stroke Scale    Flowsheet Row Most Recent Value   Level of Consciousness (1a )  0 Filed at: 12/27/2017 1700   LOC Questions (1b )  0 Filed at: 12/27/2017 1700   LOC Commands (1c )  0 Filed at: 12/27/2017 1700   Best Gaze (2 )  0 Filed at: 12/27/2017 1700   Visual (3 )  0 Filed at: 12/27/2017 1700   Facial Palsy (4 )  0 Filed at: 12/27/2017 1700   Motor Arm, Left (5a )  0 Filed at: 12/27/2017 1700   Motor Arm, Right (5b )  0 Filed at: 12/27/2017 1700   Motor Leg, Left (6a )  0 Filed at: 12/27/2017 1700   Motor Leg, Right (6b )  0 Filed at: 12/27/2017 1700   Limb Ataxia (7 )  1 Filed at: 12/27/2017 1700   Sensory (8 )  1 Filed at: 12/27/2017 1700   Best Language (9 )  0 Filed at: 12/27/2017 1700   Dysarthria (10 )  0 Filed at: 12/27/2017 1700   Extinction and Inattention (11 ) (Formerly Neglect)  0 Filed at: 12/27/2017 1700   Total  2 Filed at: 12/27/2017 1700                        MDM  Number of Diagnoses or Management Options  Cerebrovascular accident (CVA), unspecified mechanism ([facility] ): new and requires workup  Weakness of left arm: new and requires workup     Amount and/or Complexity of Data Reviewed  Clinical lab tests: reviewed and ordered  Tests in the radiology section of CPT®: reviewed and ordered  Tests in the medicine section of CPT®: ordered and reviewed  Discuss the patient with other providers: yes  Independent visualization of images, tracings, or specimens: yes    Patient Progress  Patient progress: stable    CritCare Time    Disposition  Final diagnoses:   Weakness of left arm   Cerebrovascular accident (CVA), unspecified mechanism ([facility] )     Time reflects when diagnosis was documented in both MDM as applicable and the Disposition within this note     Time User Action Codes Description Comment    12/27/2017 11:17 AM eNd Rodriguez Add [R29 [id number]] Weakness of left arm     12/27/2017 11:17 AM Ned Rodriguez Add [I63 9] Cerebrovascular accident (CVA), unspecified mechanism [facility])       ED Disposition     ED Disposition Condition Comment    Admit  Case was discussed with fang and the patient's admission status was agreed to be Admission Status: inpatient status to the service of Dr Adria Alvarez           Follow-up Information    None       Current Discharge Medication List      CONTINUE these medications which have NOT CHANGED    Details   albuterol (PROVENTIL HFA,VENTOLIN HFA) 90 mcg/act inhaler Inhale 2 puffs every 6 (six) hours as needed for wheezing      aspirin (ECOTRIN LOW STRENGTH) 81 mg EC tablet Take 81 mg by mouth daily      bisoprolol-hydrochlorothiazide (ZIAC) 10-6 25 MG per tablet Take 1 tablet by mouth daily cholecalciferol (VITAMIN D3) 1,000 units tablet Take 1,000 Units by mouth daily      fluticasone (FLOVENT DISKUS) 50 MCG/BLIST diskus inhaler Inhale 1 puff 2 (two) times a day      fluticasone-salmeterol (ADVAIR) 250-50 mcg/dose inhaler Inhale 1 puff every 12 (twelve) hours      furosemide (LASIX) 20 mg tablet Take 20 mg by mouth 2 (two) times a day      gemfibrozil (LOPID) 600 mg tablet Take 600 mg by mouth 2 (two) times a day before meals      levothyroxine 150 mcg tablet Take 150 mcg by mouth daily      Liraglutide (VICTOZA) 18 MG/3ML SOPN Inject 1 2 mg under the skin        magnesium gluconate (MAGONATE) 500 mg tablet Take 500 mg by mouth 2 (two) times a day      meloxicam (MOBIC) 7 5 mg tablet Take 7 5 mg by mouth daily      metFORMIN (GLUCOPHAGE) 1000 MG tablet Take 1,000 mg by mouth 2 (two) times a day with meals      montelukast (SINGULAIR) 10 mg tablet Take 10 mg by mouth daily at bedtime      potassium chloride (K-DUR,KLOR-CON) 20 mEq tablet Take 20 mEq by mouth daily      ranitidine (ZANTAC) 150 mg tablet Take 150 mg by mouth 2 (two) times a day      simvastatin (ZOCOR) 40 mg tablet Take 40 mg by mouth daily at bedtime      fluticasone (FLONASE) 50 mcg/act nasal spray 1 spray into each nostril daily      glimepiride (AMARYL) 4 mg tablet Take 4 mg by mouth every morning before breakfast           No discharge procedures on file      ED Provider  Electronically Signed by           CONSUELO Nascimento  12/27/17 3675

## 2017-12-27 NOTE — PROGRESS NOTES
Called by radiology to report patient with positive MRI finding for Lacunar strokes to the pre and post gyri areas on the right  Information conveyed to Dr Pallavi Nash

## 2017-12-27 NOTE — PLAN OF CARE
Activity Intolerance/Impaired Mobility     Mobility/activity is maintained at optimum level for patient Progressing        Communication Impairment     Ability to express needs and understand communication Priscilla Braxton Discharge to home or other facility with appropriate resources Progressing        Knowledge Deficit     Patient/family/caregiver demonstrates understanding of disease process, treatment plan, medications, and discharge instructions Progressing        METABOLIC, FLUID AND ELECTROLYTES - ADULT     Electrolytes maintained within normal limits Progressing     Fluid balance maintained Progressing     Glucose maintained within target range Progressing        Neurological Deficit     Neurological status is stable or improving Progressing        Nutrition     Nutrition/Hydration status is improving Progressing        Potential for Aspiration     Non-ventilated patient's risk of aspiration is minimized Progressing     Ventilated patient's risk of aspiration is minimized Progressing        SAFETY ADULT     Patient will remain free of falls Progressing     Maintain or return to baseline ADL function Progressing     Maintain or return mobility status to optimal level Progressing

## 2017-12-28 ENCOUNTER — APPOINTMENT (INPATIENT)
Dept: ULTRASOUND IMAGING | Facility: HOSPITAL | Age: 62
DRG: 065 | End: 2017-12-28
Payer: MEDICARE

## 2017-12-28 PROBLEM — E66.01 MORBID OBESITY (HCC): Status: ACTIVE | Noted: 2017-12-28

## 2017-12-28 LAB
BACTERIA UR QL AUTO: ABNORMAL /HPF
BILIRUB UR QL STRIP: NEGATIVE
CHOLEST SERPL-MCNC: 161 MG/DL (ref 50–200)
CLARITY UR: CLEAR
COLOR UR: YELLOW
EST. AVERAGE GLUCOSE BLD GHB EST-MCNC: 169 MG/DL
GLUCOSE SERPL-MCNC: 138 MG/DL (ref 65–140)
GLUCOSE SERPL-MCNC: 159 MG/DL (ref 65–140)
GLUCOSE SERPL-MCNC: 177 MG/DL (ref 65–140)
GLUCOSE SERPL-MCNC: 210 MG/DL (ref 65–140)
GLUCOSE UR STRIP-MCNC: NEGATIVE MG/DL
HBA1C MFR BLD: 7.5 % (ref 4.2–6.3)
HDLC SERPL-MCNC: 39 MG/DL (ref 40–60)
HGB UR QL STRIP.AUTO: NEGATIVE
KETONES UR STRIP-MCNC: NEGATIVE MG/DL
LDLC SERPL CALC-MCNC: 94 MG/DL (ref 0–100)
LEUKOCYTE ESTERASE UR QL STRIP: NEGATIVE
NITRITE UR QL STRIP: POSITIVE
NON-SQ EPI CELLS URNS QL MICRO: ABNORMAL /HPF
PH UR STRIP.AUTO: 5.5 [PH] (ref 4.5–8)
PROT UR STRIP-MCNC: NEGATIVE MG/DL
RBC #/AREA URNS AUTO: ABNORMAL /HPF
SP GR UR STRIP.AUTO: 1.02 (ref 1–1.03)
TRIGL SERPL-MCNC: 138 MG/DL
UROBILINOGEN UR QL STRIP.AUTO: 0.2 E.U./DL
WBC #/AREA URNS AUTO: ABNORMAL /HPF

## 2017-12-28 PROCEDURE — G8987 SELF CARE CURRENT STATUS: HCPCS

## 2017-12-28 PROCEDURE — 82948 REAGENT STRIP/BLOOD GLUCOSE: CPT

## 2017-12-28 PROCEDURE — 83036 HEMOGLOBIN GLYCOSYLATED A1C: CPT | Performed by: GENERAL PRACTICE

## 2017-12-28 PROCEDURE — 81001 URINALYSIS AUTO W/SCOPE: CPT | Performed by: NURSE PRACTITIONER

## 2017-12-28 PROCEDURE — G8979 MOBILITY GOAL STATUS: HCPCS

## 2017-12-28 PROCEDURE — G8978 MOBILITY CURRENT STATUS: HCPCS

## 2017-12-28 PROCEDURE — 97165 OT EVAL LOW COMPLEX 30 MIN: CPT

## 2017-12-28 PROCEDURE — 87077 CULTURE AEROBIC IDENTIFY: CPT | Performed by: NURSE PRACTITIONER

## 2017-12-28 PROCEDURE — 97163 PT EVAL HIGH COMPLEX 45 MIN: CPT

## 2017-12-28 PROCEDURE — 87086 URINE CULTURE/COLONY COUNT: CPT | Performed by: NURSE PRACTITIONER

## 2017-12-28 PROCEDURE — 97110 THERAPEUTIC EXERCISES: CPT

## 2017-12-28 PROCEDURE — 80061 LIPID PANEL: CPT | Performed by: GENERAL PRACTICE

## 2017-12-28 PROCEDURE — 93880 EXTRACRANIAL BILAT STUDY: CPT

## 2017-12-28 PROCEDURE — G8988 SELF CARE GOAL STATUS: HCPCS

## 2017-12-28 PROCEDURE — 87186 SC STD MICRODIL/AGAR DIL: CPT | Performed by: NURSE PRACTITIONER

## 2017-12-28 RX ADMIN — CHOLECALCIFEROL TAB 25 MCG (1000 UNIT) 1000 UNITS: 25 TAB at 09:20

## 2017-12-28 RX ADMIN — BISOPROLOL FUMARATE 10 MG: 5 TABLET ORAL at 09:19

## 2017-12-28 RX ADMIN — Medication 500 MG: at 17:05

## 2017-12-28 RX ADMIN — HEPARIN SODIUM 5000 UNITS: 5000 INJECTION, SOLUTION INTRAVENOUS; SUBCUTANEOUS at 13:20

## 2017-12-28 RX ADMIN — FLUTICASONE PROPIONATE AND SALMETEROL 1 PUFF: 50; 250 POWDER RESPIRATORY (INHALATION) at 21:13

## 2017-12-28 RX ADMIN — ASPIRIN 325 MG: 325 TABLET ORAL at 09:19

## 2017-12-28 RX ADMIN — INSULIN LISPRO 1 UNITS: 100 INJECTION, SOLUTION INTRAVENOUS; SUBCUTANEOUS at 12:48

## 2017-12-28 RX ADMIN — HEPARIN SODIUM 5000 UNITS: 5000 INJECTION, SOLUTION INTRAVENOUS; SUBCUTANEOUS at 21:13

## 2017-12-28 RX ADMIN — HEPARIN SODIUM 5000 UNITS: 5000 INJECTION, SOLUTION INTRAVENOUS; SUBCUTANEOUS at 05:23

## 2017-12-28 RX ADMIN — INSULIN LISPRO 1 UNITS: 100 INJECTION, SOLUTION INTRAVENOUS; SUBCUTANEOUS at 17:08

## 2017-12-28 RX ADMIN — Medication 500 MG: at 09:19

## 2017-12-28 RX ADMIN — ATORVASTATIN CALCIUM 40 MG: 40 TABLET, FILM COATED ORAL at 17:05

## 2017-12-28 RX ADMIN — FUROSEMIDE 20 MG: 20 TABLET ORAL at 17:13

## 2017-12-28 RX ADMIN — FAMOTIDINE 20 MG: 20 TABLET, FILM COATED ORAL at 09:20

## 2017-12-28 RX ADMIN — LEVOTHYROXINE SODIUM 150 MCG: 150 TABLET ORAL at 05:23

## 2017-12-28 RX ADMIN — POTASSIUM CHLORIDE 20 MEQ: 1500 TABLET, EXTENDED RELEASE ORAL at 09:20

## 2017-12-28 RX ADMIN — INSULIN LISPRO 1 UNITS: 100 INJECTION, SOLUTION INTRAVENOUS; SUBCUTANEOUS at 21:18

## 2017-12-28 RX ADMIN — MONTELUKAST SODIUM 10 MG: 10 TABLET, FILM COATED ORAL at 21:13

## 2017-12-28 RX ADMIN — FUROSEMIDE 20 MG: 20 TABLET ORAL at 09:20

## 2017-12-28 RX ADMIN — FLUTICASONE PROPIONATE AND SALMETEROL 1 PUFF: 50; 250 POWDER RESPIRATORY (INHALATION) at 09:24

## 2017-12-28 RX ADMIN — HYDROCHLOROTHIAZIDE 6.25 MG: 12.5 TABLET ORAL at 09:20

## 2017-12-28 RX ADMIN — FAMOTIDINE 20 MG: 20 TABLET, FILM COATED ORAL at 17:05

## 2017-12-28 NOTE — PROGRESS NOTES
Progress Note - Ayaz Client 1955, 58 y o  female MRN: [id number]    Unit/Bed#: -Concepcion Encounter: [id number]    Primary Care Provider: Darryl De Jesus   Date and time admitted to hospital: 12/27/2017 10:02 AM      * Numbness   Assessment & Plan    · Present on admission, numbness of left forearm and left hand  · Workup to rule out acute stroke, includes MRI, MRA and CT CT of the brain done no acute findings  · Carotid ultrasound pending  · Neurology following recommendations appreciated  · Continue to monitor risk factors of stroke, continue Lipitor, aspirin  · PTOT evaluation recommendation appreciated  · Patient will need outpatient OT  Morbid obesity ([address] )   Assessment & Plan    · As evidenced by a BMI of 40  In the setting of insulin-dependent diabetes type 2   · Lifestyle modification weight lost encourage  · Nutrition consult  Hypothyroidism   Assessment & Plan    · Recent TSH within normal limits  · Continue levothyroxine  COPD (chronic obstructive pulmonary disease) (HCC)   Assessment & Plan    · No acute exacerbation of COPD noted at this time  · Continue home regimen of Singulair, Advair, Flovent  · Respiratory protocol as needed  Hyperlipidemia   Assessment & Plan    · Lipid panel reviewed  · Continue statin      Hypertension   Assessment & Plan    · Hypertensive crisis on admission, currently blood pressure is stable  · Continue to monitor  · Continue current regimen  Type 2 diabetes mellitus (HCC)   Assessment & Plan    · Type 2 diabetes insulin dependent with obesity  ·  Recent A1c 7 5  · Blood sugar acceptable this morning  · Continue to monitor with sliding scale and Victoza from home  · Lifestyle modification and weight loss encourage, dietary changes to low carb encourage, nutrition consult  Labs reviewed, UA noted nitrites patient denies dysuria, abdominal pain no fever no leukocytosis noted  Will repeat UA        VTE Pharmacologic Prophylaxis:   Pharmacologic: Heparin  Mechanical VTE Prophylaxis in Place: Yes    Patient Centered Rounds: I have performed bedside rounds with nursing staff today  Discussions with Specialists or Other Care Team Provider:  Primary nurse, neurology, case management    Education and Discussions with Family / Patient:  Patient, her son at the bedside  Time Spent for Care: 40  More than 50% of total time spent on counseling and coordination of care as described above  Current Length of Stay: 1 day(s)    Current Patient Status: Inpatient   Certification Statement: The patient will continue to require additional inpatient hospital stay due to Continue stroke workup    Discharge Plan:  Pending medical clearance    Code Status: Level 1 - Full Code      Subjective:  Patient is awake alert oriented x3 she sitting out of bed in the chair she is pleasant  She continues to complain of left forearm left hand numbness  No other neurological symptoms noted  She is ambulatory gait is steady  Plan of care discussed with patient and her son at the bedside both verbalized understanding  Objective:     Vitals:   Temp (24hrs), Av 1 °F (36 7 °C), Min:97 9 °F (36 6 °C), Max:98 6 °F (37 °C)    HR:  [72-81] 78  Resp:  [15-22] 18  BP: (113-207)/() 132/68  SpO2:  [95 %-100 %] 97 %  Body mass index is 40 kg/m²  Input and Output Summary (last 24 hours): Intake/Output Summary (Last 24 hours) at 17 1146  Last data filed at 17 0200   Gross per 24 hour   Intake              480 ml   Output             2350 ml   Net            -1870 ml       Physical Exam:     Physical Exam   Constitutional: She is oriented to person, place, and time  She appears well-developed and well-nourished  No distress  Awake alert oriented x3 no acute distress noted, obese  HENT:   Head: Normocephalic and atraumatic  Eyes: Pupils are equal, round, and reactive to light  Neck: Normal range of motion  Neck supple  Cardiovascular: Normal rate, regular rhythm and normal heart sounds  No murmur heard  Pulmonary/Chest: Effort normal and breath sounds normal  No respiratory distress  She exhibits no tenderness  Abdominal: Soft  Bowel sounds are normal    Obese, nontender   Genitourinary:   Genitourinary Comments: Denies dysuria   Musculoskeletal: Normal range of motion  She exhibits no edema  Neurological: She is alert and oriented to person, place, and time  Skin: Skin is warm and dry  She is not diaphoretic  Psychiatric: She has a normal mood and affect  Her behavior is normal    Nursing note and vitals reviewed  Additional Data:     Labs:      Results from last 7 days  Lab Units 12/27/17  1416 12/27/17  1037   WBC Thousand/uL  --  6 98   HEMOGLOBIN g/dL  --  11 5   HEMATOCRIT %  --  38 9   PLATELETS Thousands/uL 237 261   NEUTROS PCT %  --  70   LYMPHS PCT %  --  18   MONOS PCT %  --  9   EOS PCT %  --  2       Results from last 7 days  Lab Units 12/27/17  1037   SODIUM mmol/L 141   POTASSIUM mmol/L 4 8   CHLORIDE mmol/L 104   CO2 mmol/L 31   BUN mg/dL 12   CREATININE mg/dL 0 89   CALCIUM mg/dL 9 2   TOTAL PROTEIN g/dL 6 8   BILIRUBIN TOTAL mg/dL 0 40   ALK PHOS U/L 132*   ALT U/L 30   AST U/L 25   GLUCOSE RANDOM mg/dL 234*       Results from last 7 days  Lab Units 12/27/17  1037   INR  0 94       * I Have Reviewed All Lab Data Listed Above  * Additional Pertinent Lab Tests Reviewed:  [facility]    Imaging:    Imaging Reports Reviewed Today Include:  X-ray, CT, MRI, MRA  Imaging Personally Reviewed by Myself Includes:      Recent Cultures (last 7 days):           Last 24 Hours Medication List:     aspirin 325 mg Oral Daily   atorvastatin 40 mg Oral QPM   bisoprolol 10 mg Oral Daily   And      hydrochlorothiazide 6 25 mg Oral Daily   cholecalciferol 1,000 Units Oral Daily   famotidine 20 mg Oral BID   fluticasone-salmeterol 1 puff Inhalation Q12H STUART   furosemide 20 mg Oral BID   heparin (porcine) 5,000 Units Subcutaneous Q8H [address]   insulin lispro 1-5 Units Subcutaneous TID AC   insulin lispro 1-5 Units Subcutaneous HS   levothyroxine 150 mcg Oral Daily   Liraglutide 1 2 mg Subcutaneous Daily   magnesium gluconate 500 mg Oral BID   montelukast 10 mg Oral HS   potassium chloride 20 mEq Oral Daily        Today, Patient Was Seen By: CONSUELO Robert    ** Please Note: Dictation voice to text software may have been used in the creation of this document   **

## 2017-12-28 NOTE — PLAN OF CARE
Problem: OCCUPATIONAL THERAPY ADULT  Goal: Performs self-care activities at highest level of function for planned discharge setting  See evaluation for individualized goals  Treatment Interventions: ADL retraining, Fine motor coordination activities, Continued evaluation          See flowsheet documentation for full assessment, interventions and recommendations  Limitation: Decreased fine motor control, Decreased high-level ADLs  Prognosis: Good  Assessment: Patient is a 58 y o  female seen for OT evaluation s/p admit to [address] [facility] on 12/27/2017 w/Numbness  Patient presented to ED with c/o of numbness to LUE  Commorbidities affecting patient's functional performance at time of assessment include:Typw 2 DM, HTN, Hyperlipidemia, COPD, Hypothyroidism  Prior to admission, Patient reporting independent with ADLs/ IADLs, ambulatory with no AD, lives with son and daughter in law in a one story house, 4 SANDRA with railing  Patient manages medications independently, and drives  Personal factors affecting patient at time of initial evaluation include: steps to enter  Upon evaluation, patient requires modified independent assist for UB ADLs, modified independent assist for LB ADLs, transfers and functional ambulation in room and bathroom with supervision assist, with no AD  Persistent c/o of numbness to LUE with decreased  strength and limited finger opposition noted during evaluation  Occupational performance is affected by the following deficits: in hand manipulation deficit with impaired reach, grasp and coordination and decreased muscle strength  Therapist completed brief history review of medical records related to the presenting problem, clinical examination identifying  1-3 performance deficits, clinical decision making of low complexity , consistent with a  low complexity level evaluation   Patient to benefit from continued Occupational Therapy treatment while in the hospital to address deficits as defined above and maximize level of functional independence with ADLs and functional mobility  Occupational Performance areas to address include: transfer to all surfaces, functional mobility, IADLs: safety procedures, IADLs: meal prep/ clean up, Leisure Participation and Social participation   From OT standpoint, recommendation at time of d/c would be Home with family support and anticipate no needs post d/c from [address] Discharge Recommendation: Home with family support  OT - OK to Discharge: Yes (Home with family support)

## 2017-12-28 NOTE — ASSESSMENT & PLAN NOTE
· Hypertensive crisis on admission, currently blood pressure is stable  · Continue to monitor  · Continue current regimen

## 2017-12-28 NOTE — PROGRESS NOTES
Progress Note - Neurology   Evan Lozada 58 y o  female MRN: [id number]  Unit/Bed#: -01 Encounter: [id number]      Subjective:   Patient is alert awake oriented sitting out of bed, denies any new neurological symptoms, continues to have numbness of the left hand and the forearm, with confirmed evidence of diffusion defects on the MRI  Stroke workup is in progress, carotid ultrasound, 2D echo pending  Patient was also evaluated by rehab  ROS: 12 system cued query was unchanged from org  consult note  Vitals:   Vitals:    12/28/17 0700   BP: 136/68   Pulse: 77   Resp: 18   Temp: 98 °F (36 7 °C)   SpO2: 96%   ,Body mass index is 40 kg/m²  MEDS:    aspirin 325 mg Oral Daily   atorvastatin 40 mg Oral QPM   bisoprolol 10 mg Oral Daily   And      hydrochlorothiazide 6 25 mg Oral Daily   cholecalciferol 1,000 Units Oral Daily   famotidine 20 mg Oral BID   fluticasone-salmeterol 1 puff Inhalation Q12H STUART   furosemide 20 mg Oral BID   heparin (porcine) 5,000 Units Subcutaneous Q8H [facility]   insulin lispro 1-5 Units Subcutaneous TID AC   insulin lispro 1-5 Units Subcutaneous HS   levothyroxine 150 mcg Oral Daily   Liraglutide 1 2 mg Subcutaneous Daily   magnesium gluconate 500 mg Oral BID   montelukast 10 mg Oral HS   potassium chloride 20 mEq Oral Daily   :    Physical Exam:  General appearance: alert, appears stated age and cooperative  Head: Normocephalic, without obvious abnormality, atraumatic    Neurologic:  On neurological examination the patient is alert awake oriented, speech is fluent, no evidence of any facial asymmetry noted, strength in the left upper extremity distally has improved, fine finger movements have improved, deep tendon reflexes are hypoactive and sensory examination remains unremarkable  Mild 3 a noted on left finger-to-nose exam     Lab Results: I have personally reviewed pertinent reports    Imaging Studies: I have personally reviewed pertinent films in PACS Assessment:  1  Acute CVA,  most likely secondary to small vessel disease  2  Hypertension  3  Diabetes mellitus  4  Hyperlipidemia  Plan:  Continue aspirin 325 mg daily, await carotid ultrasound and 2D echo results, neuro checks to continue, will follow up this patient with you  Shirley Chavis MD  12/28/2017,9:46 AM    Dictation voice to text software has been used in the creation of this document  Please consider this in light of any contextual or grammatical errors

## 2017-12-28 NOTE — PLAN OF CARE
Problem: DISCHARGE PLANNING - CARE MANAGEMENT  Goal: Discharge to post-acute care or home with appropriate resources  INTERVENTIONS:  - Conduct assessment to determine patient/family and health care team treatment goals, and need for post-acute services based on payer coverage, community resources, and patient preferences, and barriers to discharge  - Address psychosocial, clinical, and financial barriers to discharge as identified in assessment in conjunction with the patient/family and health care team  - Arrange appropriate level of post-acute services according to patient's   needs and preference and payer coverage in collaboration with the physician and health care team  - Communicate with and update the patient/family, physician, and health care team regarding progress on the discharge plan  - Arrange appropriate transportation to post-acute venues  Outcome: Progressing  Cm met with pt at bedside  Pt lives with her son Efe Lopez and daughter-in-law in a one story house with 4 steps w/railing to enter  Normally she has no problem navigating steps and is independent with ADL's  She has used OP/PT for knee pain at [facility] in [facility]  She denies issues with drugs or alcohol  Denies hx of anxiety or depression  Her PCP is Dr Yan Painter  She uses a glucometer and has a nebulizer to use prn  Dr Halima Lisa is her pulmonologist   She uses [facility] and has no problem with her co-pays  She does not have a POA or Advanced Directive  CM supplied info on both  She does not work, but still drives  Her son or a friend will transport home when medically cleared  CM discussed discharge needs and none were identified  CM will continue to follow

## 2017-12-28 NOTE — PLAN OF CARE
Activity Intolerance/Impaired Mobility     Mobility/activity is maintained at optimum level for patient Progressing        Communication Impairment     Ability to express needs and understand communication [address] Discharge to home or other facility with appropriate resources Progressing        Knowledge Deficit     Patient/family/caregiver demonstrates understanding of disease process, treatment plan, medications, and discharge instructions Progressing        METABOLIC, FLUID AND ELECTROLYTES - ADULT     Electrolytes maintained within normal limits Progressing     Fluid balance maintained Progressing     Glucose maintained within target range Progressing        Neurological Deficit     Neurological status is stable or improving Progressing        Nutrition     Nutrition/Hydration status is improving Progressing        Potential for Aspiration     Non-ventilated patient's risk of aspiration is minimized Progressing     Ventilated patient's risk of aspiration is minimized Progressing        Potential for Falls     Patient will remain free of falls Progressing        SAFETY ADULT     Patient will remain free of falls Progressing     Maintain or return to baseline ADL function Progressing     Maintain or return mobility status to optimal level Progressing

## 2017-12-28 NOTE — ASSESSMENT & PLAN NOTE
· No acute exacerbation of COPD noted at this time  · Continue home regimen of Singulair, Advair, Flovent  · Respiratory protocol as needed

## 2017-12-28 NOTE — ASSESSMENT & PLAN NOTE
· Type 2 diabetes insulin dependent with obesity  ·  Recent A1c 7 5  · Blood sugar acceptable this morning  · Continue to monitor with sliding scale and Victoza from home  · Lifestyle modification and weight loss encourage, dietary changes to low carb encourage, nutrition consult

## 2017-12-28 NOTE — MALNUTRITION/BMI
This medical record reflects one or more clinical indicators suggestive of malnutrition and/or morbid obesity  BMI Findings:  40-44 9    Body mass index is 40 kg/m²  See Nutrition note dated 12/28/17 for additional details  Completed nutrition assessment is viewable in the nutrition documentation

## 2017-12-28 NOTE — OCCUPATIONAL THERAPY NOTE
Occupational Therapy Evaluation        Patient Name: Flynn Mendieta  IHOIC'J Date: 12/28/2017 12/28/17 1558   Note Type   Note type Eval/Treat   Restrictions/Precautions   Other Precautions Fall Risk;Telemetry   Pain Assessment   Pain Assessment No/denies pain   Pain Score No Pain   Home Living   Type of [address] One level; Laundry in basement;Stairs to enter with rails; Performs ADLs on one level; Able to live on main level with bedroom/bathroom  (4 SANDRA with railing)   Bathroom Shower/Tub Tub/shower unit   Bathroom Toilet Standard   Bathroom Equipment (No DME)   [address] (No AD at baseline)   Additional Comments patient drives   Prior Function   Level of [place] Independent with ADLs and functional mobility   Lives With Son  (resides c son + dtr in law)   Receives Help From Family   ADL Assistance Independent   IADLs Independent   Falls in the last 6 months 1 to 4  (reports 1 fall in shower 2* slippery bath mat)   Vocational On disability  (2* COPD)   Lifestyle   Autonomy Patient reporting independnet with ADLs/ IADLs, ambulatory with no AD, lives with son and daughter in law in a one story house, 4 SANDRA with railing  Patient manages medications independently, and drives  Reciprocal Relationships Supportive Family   Psychosocial   Psychosocial (WDL) WDL   ADL   Eating Assistance 7  Independent   Grooming Assistance 6  Modified Independent   UB Bathing Assistance 6  Modified Independent   LB Bathing Assistance 6  Modified Independent   UB Dressing Assistance 6  Modified independent   LB Dressing Assistance 6  Modified independent   Toileting Assistance  6  Modified independent   Functional Assistance 6  Modified independent   Bed Mobility   Rolling R 7  Independent   Rolling L 7  Independent   Supine to Sit 6  Modified independent   Transfers   Sit to Stand 6  Modified independent   Additional items Armrests; Verbal cues   Stand to Sit 6 Modified independent   Additional items Armrests; Verbal cues   Functional Mobility   Functional Mobility 6  Modified independent   Additional items (no AD)   Balance   Static Sitting Good   Dynamic Sitting Good   Static Standing Good   Dynamic Standing Fair +   Activity Tolerance   Activity Tolerance Patient limited by fatigue; Other (Comment)  (persistent numbness on left UE)   Nurse Made Aware SERENE Inman verbalized patient appropriate for therapy    RUE Assessment   RUE Assessment WFL   LUE Assessment   LUE Assessment X  (AROM grossly WFL, persistent numbness)   LUE Overall AROM   L Mass Grasp full grasp/ limited ind finger movements   LUE Strength   L Shoulder Flexion 4-/5   L Wrist Flexion 3+/5   L Wrist Extension 3/5   Hand Function   Gross Motor Coordination Functional   Fine Motor Coordination Impaired  (limited finger opposition left hand)   Sensation   Light Touch No apparent deficits  (BUEs despite numbness on LUE)   Proprioception   Proprioception No apparent deficits  (BUEs)   Vision-Basic Assessment   Current Vision Wears glasses only for reading   Patient Visual Report Other (Comment)  (No changes reported)   Vision - Complex Assessment   Ocular Range of Motion [facility]   Perception   Inattention/Neglect Appears intact   Cognition   Overall Cognitive Status WFL   Arousal/Participation Alert; Responsive; Cooperative   Attention Within functional limits   Orientation Level Oriented X4   Memory Within functional limits   Following Commands Follows all commands and directions without difficulty   Assessment   Limitation Decreased fine motor control;Decreased high-level ADLs   Prognosis Good   Assessment Patient is a 58 y o  female seen for OT evaluation s/p admit to [facility] [place] on 12/27/2017 w/Numbness  Patient presented to ED with c/o of numbness to LUE  Commorbidities affecting patient's functional performance at time of assessment include:Typw 2 DM, HTN, Hyperlipidemia, COPD, Hypothyroidism    Prior to admission, Patient reporting independent with ADLs/ IADLs, ambulatory with no AD, lives with son and daughter in law in a one story house, 4 SANDR[address] with railing  Patient manages medications independently, and drives  Personal factors affecting patient at time of initial evaluation include: steps to enter  Upon evaluation, patient requires modified independent assist for UB ADLs, modified independent assist for LB ADLs, transfers and functional ambulation in room and bathroom with supervision assist, with no AD  Persistent c/o of numbness to LUE with decreased  strength and limited finger opposition noted during evaluation  Occupational performance is affected by the following deficits: in hand manipulation deficit with impaired reach, grasp and coordination and decreased muscle strength  Therapist completed brief history review of medical records related to the presenting problem, clinical examination identifying  1-3 performance deficits, clinical decision making of low complexity , consistent with a  low complexity level evaluation  Patient to benefit from continued Occupational Therapy treatment while in the hospital to address deficits as defined above and maximize level of functional independence with ADLs and functional mobility  Occupational Performance areas to address include: transfer to all surfaces, functional mobility, IADLs: safety procedures, IADLs: meal prep/ clean up, Leisure Participation and Social participation   From OT standpoint, recommendation at time of d/c would be Home with family support and anticipate no needs post d/c from Hospital       Plan   Treatment Interventions ADL retraining;Fine motor coordination activities;Continued evaluation   Goal Expiration Date 12/30/17   Treatment Day 1   OT Frequency 1-2x/wk   Additional Treatment Session   Start Time 0911   End Time 3397   Treatment Assessment Patient participated in Skilled OT session this date with interventions consisting of therapeutic exercise to: increase functional use of BUEs, increase BUE muscle strength , increase cardiovascular endurance  and neuromuscular re education to: facilitate volitional use of limbs   Patient completed UE therapeutic exercises against gravity, isolating all LUE joints, focusing on pronation/ supination/ wrist extension and flexion, individual finger movements  Patient educated on HEP and the importance of monitoring functional use of LUE  Notify MD of any subsequent changes in function  Patient verbalized understanding and demonstrated exercises correctly  From OT standpoint, recommendation at time of d/c would be Home with family support and anticipate no needs post d/c from Hospital     Patient to benefit from continued Occupational Therapy treatment while in the hospital to address deficits as defined above and maximize level of functional independence with ADLs and functional mobility  Recommendation   OT Discharge Recommendation Home with family support   OT - OK to Discharge Yes  (Home with family support)   Barthel Index   Feeding 10   Bathing 5   Grooming Score 5   Dressing Score 10   Bladder Score 10   Bowels Score 10   Toilet Use Score 10   Transfers (Bed/Chair) Score 10   Mobility (Level Surface) Score 10   Stairs Score 0   Barthel Index Score 80   Modified [place] Scale   Modified [place] Scale 1     Patient will demonstrate use of  left hand as a functional stabilizer during bimanual tasks  Patient will demonstrate use of  Left hand as a functional assist during self care tasks with no deficits / no further complains of numbness     Patient/ Family  will demonstrate competency with UE Home Exercise Program

## 2017-12-28 NOTE — CASE MANAGEMENT
Initial Clinical Review    Admission: Date/Time/Statement: 12/27/17 @ 1117     Orders Placed This Encounter   Procedures    Inpatient Admission (expected length of stay for this patient is greater than two midnights)     Standing Status:   Standing     Number of Occurrences:   1     Order Specific Question:   Admitting Physician     Answer:   Sue Clements [[id number]]     Order Specific Question:   Level of Care     Answer:   Med Surg [[id number]]     Order Specific Question:   Estimated length of stay     Answer:   More than 2 Midnights     Order Specific Question:   Certification     Answer:   I certify that inpatient services are medically necessary for this patient for a duration of greater than two midnights  See H&P and MD Progress Notes for additional information about the patient's course of treatment  ED: Date/Time/Mode of Arrival:   ED Arrival Information     Expected Arrival Acuity Means of Arrival Escorted By Service Admission Type    - 12/27/2017 09:59 Emergent Walk-In Family Member General Medicine Emergency    Arrival Complaint    Left arm numbness          Chief Complaint:   Chief Complaint   Patient presents with    Numbness     pt presents with left arm, numbness, states it began in the middle of the night, no other complaints at this time        History of Illness: Bushra Caban is a 58 y o  female who presents with left arm numbness  Patient says she went to bed feeling well last evening awoke overnight thinking she slept on her arm causing numbness and fell back to sleep and then she woke again this morning with left arm numbness and some clumsiness  She has no other associated symptoms she denies any chest pain or shortness of breath specifically  She denies history of stroke but she is diabetic      ED Vital Signs:   ED Triage Vitals [12/27/17 1003]   Temperature Pulse Respirations Blood Pressure SpO2   97 9 °F (36 6 °C) 96 18 (!) 213/97 97 %      Temp Source Heart Rate Source Patient Position - Orthostatic VS BP Location FiO2 (%)   Oral Monitor Sitting Right arm --      Pain Score       No Pain        Wt Readings from Last 1 Encounters:   12/27/17 109 kg (240 lb 6 4 oz)       Vital Signs (abnormal):   12/27/17 1815  98 1 °F (36 7 °C)  77  18   175/76  --  98 %  --  Lying   12/27/17 1737  --  --  --  --  --  --  None (Room air)  --   12/27/17 1615  --  81  16   175/82  --  --  None (Room air)  Sitting   12/27/17 1418  --  72  15   189/91  --  99 %  None (Room air)  Lying   12/27/17 1315  --  75  22  139/79  --  99 %  None (Room air)  Lying   12/27/17 1215  --  74  21   207/100  --  100 %  None (Room air)  Lying   12/27/17 1117  --  78   23   207/100  --  99 %  None (Room air)  Lying   12/27/17 1015  --  86  19   199/101  --  99 %             Abnormal Labs/Diagnostic Test Results:   MRI brain     Small foci of acute ischemia at the right precentral and post central gyri   No acute intracranial hemorrhage  Mild scattered periventricular and subcortical foci of white matter T2 hyperintensity which is nonspecific and most likely related to chronic small vessel ischemic changes   Other less likely etiologies include demyelinating disease, vasculitis, Lyme and   migraines  MRA brain       No focal stenosis or saccular aneurysm within the [place]  CT brain     No acute intracranial abnormality            EKG   Normal sinus rhythm  Normal ECG  No previous ECGs available     Troponin <0 02     WBC 6 98 4 31 - 10 16 Thousand/uL     RBC 4 84 3 81 - 5 12 Million/uL     Hemoglobin 11 5 11 5 - 15 4 g/dL     Hematocrit 38 9 34 8 - 46 1 %       Protime 12 8 12 1 - 14 4 seconds     INR 0 94 0 86 - 1 16       Sodium 141 136 - 145 mmol/L     Potassium 4 8 3 5 - 5 3 mmol/L     Chloride 104 100 - 108 mmol/L     CO2 31 21 - 32 mmol/L     Anion Gap 6 4 - 13 mmol/L     BUN 12 5 - 25 mg/dL     Creatinine 0 89 0 60 - 1 30 mg/dL     Comment: Standardized to IDMS reference method       Glucose 234 (H) 65 - 140 mg/dL     Comment:   If the patient is fasting, the ADA then defines impaired fasting glucose as > 100 mg/dL and diabetes as > or equal to 123 mg/dL  Specimen collection should occur prior to Sulfasalazine administration due to the potential for falsely depressed results  Specimen collection should occur prior to Sulfapyridine administration due to the potential for falsely elevated results  Calcium 9 2 8 3 - 10 1 mg/dL     AST 25 5 - 45 U/L     Comment:   Specimen collection should occur prior to Sulfasalazine administration due to the potential for falsely depressed results  ALT 30 12 - 78 U/L     Comment:   Specimen collection should occur prior to Sulfasalazine administration due to the potential for falsely depressed results          Alkaline Phosphatase 132 (H) 46 - 116 U/L     Total Protein 6 8 6 4 - 8 2 g/dL     Albumin 3 2 (L) 3 5 - 5 0 g/dL     Total Bilirubin 0 40 0 20 - 1 00 mg/dL     eGFR 70 ml/min/1 73sq m       Color, UA Yellow    Clarity, UA Clear    Specific [place], UA 1 015 1 003 - 1 030     pH, UA 6 0 4 5 - 8 0     Leukocytes, UA Negative Negative     Nitrite, UA Positive (A) Negative     Protein, UA Negative Negative mg/dl     Glucose, UA Negative Negative mg/dl     Ketones, UA Negative Negative mg/dl     Urobilinogen, UA 0 2 0 2, 1 0 E U /dl E U /dl     Bilirubin, UA Negative Negative     Blood, UA Negative Negative    Urine Microscopic [[id number]] (Abnormal) Collected: 12/27/17 1412   Lab Status: Final result Specimen: Urine from Urine, Clean Catch Updated: 12/27/17 1439    RBC, UA None Seen None Seen, 0-5 /hpf     WBC, UA None Seen None Seen, 0-5, 5-55, 5-65 /hpf     Epithelial Cells Occasional None Seen, Occasional /hpf     Bacteria, UA Innumerable (A) None Seen, Occasional /hpf        ED Treatment:   Medication Administration from 12/27/2017 0959 to 12/27/2017 1712       Date/Time Order Dose Route Action Comments     12/27/2017 1448 aspirin chewable tablet 81 mg 81 mg Oral Not Given pt took am medications this morning  12/27/2017 1448 heparin (porcine) subcutaneous injection 5,000 Units 5,000 Units Subcutaneous Given      12/27/2017 1351 bisoprolol-hydrochlorothiazide (ZIAC) 10-6 25 MG per tablet 1 tablet 1 tablet Oral Not Given d/c order     12/27/2017 1448 cholecalciferol (VITAMIN D3) tablet 1,000 Units 1,000 Units Oral Not Given pt took am medications this morning     12/27/2017 1449 fluticasone-salmeterol (ADVAIR) 250-50 mcg/dose inhaler 1 puff 1 puff Inhalation Not Given Pt took am medications this morning  12/27/2017 1450 levothyroxine tablet 150 mcg 150 mcg Oral Not Given Pt took am medication this morning  12/27/2017 1450 potassium chloride (K-DUR,KLOR-CON) CR tablet 20 mEq 20 mEq Oral Not Given pt took am medications this morning  12/27/2017 1451 bisoprolol (ZEBETA) tablet 10 mg 10 mg Oral Not Given Pt took am medications this morning     12/27/2017 1450 hydrochlorothiazide (HYDRODIURIL) tablet 6 25 mg 6 25 mg Oral Not Given Pt took am medication this morning  12/27/2017 1607 aspirin tablet 325 mg 325 mg Oral Given           Past Medical/Surgical History:    Active Ambulatory Problems     Diagnosis Date Noted    No Active Ambulatory Problems     Resolved Ambulatory Problems     Diagnosis Date Noted    No Resolved Ambulatory Problems     Past Medical History:   Diagnosis Date    COPD (chronic obstructive pulmonary disease) ([place] [address] )     Diabetes mellitus ([place] [address] )     Disease of thyroid gland     Hyperlipidemia     Hypertension        Admitting Diagnosis: Numbness [R20 0]  Weakness of left arm [R29 898]  Cerebrovascular accident (CVA), unspecified mechanism ([place] [address] ) [I63 9]    Age/Sex: 58 y o  female    Assessment/Plan: Hospital Problem List:      Principal Problem:    Numbness  Active Problems:    Type 2 diabetes mellitus (HCC)    Hypertension    Hyperlipidemia    COPD (chronic obstructive pulmonary disease) (HCC)    Hypothyroidism        Plan for the Primary Problem(s):     Principal Problem:    Numbness-r/o cva-neurology consult; mri/mra with acute lacunar infarcts-d/w neurology; increase asa to 325mg po daily; echo pending  Active Problems:    Type 2 diabetes mellitus (HCC)-check a1c and ss coverage    Hypertension-continue outpatient meds and hydralazine    Hyperlipidemia-on lopid as outpatient; change to lipitor    COPD (chronic obstructive pulmonary disease) (HCC)-not in exacerbation-continue mdi;s    Hypothyroidism-on replacement and check tsh        VTE Prophylaxis: Heparin  / sequential compression device        Anticipated Length of Stay:  Patient will be admitted on an Inpatient basis with an anticipated length of stay of  > 2 midnights     Justification for Hospital Stay: cva       Admission Orders:  Scheduled Meds:   aspirin 325 mg Oral Daily   atorvastatin 40 mg Oral QPM   bisoprolol 10 mg Oral Daily   And      hydrochlorothiazide 6 25 mg Oral Daily   cholecalciferol 1,000 Units Oral Daily   famotidine 20 mg Oral BID   fluticasone-salmeterol 1 puff Inhalation Q12H ST[place]   furosemide 20 mg Oral BID   heparin (porcine) 5,000 Units Subcutaneous Q8H [facility]   insulin lispro 1-5 Units Subcutaneous TID AC   insulin lispro 1-5 Units Subcutaneous HS   levothyroxine 150 mcg Oral Daily   Liraglutide 1 2 mg Subcutaneous Daily   magnesium gluconate 500 mg Oral BID   montelukast 10 mg Oral HS   potassium chloride 20 mEq Oral Daily     Continuous Infusions:    PRN Meds:   acetaminophen    albuterol    hydrALAZINE    ondansetron    Diet You/CHO controlled  Dysphagia screen   Up OOB as brian  Consult neurology  Neuro checks with VS q 1h x 4, q 2h x 4, q4h x 74 hrs then q8h if stable  Urine culture  Accuchecks 3 times/day  Incentive spirometry  VAS carotid study  PT/OT   SCD's   Telemetry  Provide stroke education to patient

## 2017-12-28 NOTE — SOCIAL WORK
Cm met with pt at bedside  Pt lives with her son Lizabeth Olson and daughter-in-law in a one story house with 4 steps w/railing to enter  Normally she has no problem navigating steps and is independent with ADL's  She has used OP/PT for knee pain at [facility] in [facility]  She denies issues with drugs or alcohol  Denies hx of anxiety or depression  Her PCP is Dr Rosmery Lawson  She uses a glucometer and has a nebulizer to use prn  Dr Jennifer Doran is her pulmonologist   She uses [facility] and has no problem with her co-pays  She does not have a POA or Advanced Directive  CM supplied info on both  She does not work, but still drives  Her son or a friend will transport home when medically cleared  CM discussed discharge needs and none were identified  CM will continue to follow

## 2017-12-28 NOTE — ASSESSMENT & PLAN NOTE
· As evidenced by a BMI of 40  In the setting of insulin-dependent diabetes type 2   · Lifestyle modification weight lost encourage  · Nutrition consult

## 2017-12-28 NOTE — PHYSICAL THERAPY NOTE
PT Evaluation (15min)  (7:30-7:45)    Past Medical History:   Diagnosis Date    [facility] obstructive [facility]) [facility]     Diabetes mellitus ([facility] )     Disease of thyroid gland     Hyperlipidemia     Hypertension         12/28/17 0745   Note Type   Note type Eval only   Pain Assessment   Pain Assessment No/denies pain   Home Living   Type of [address] One level;Stairs to enter with rails  (4 SANDRA)   Prior Function   Level of [place] Independent with ADLs and functional mobility   Lives With Son  (resides c son + dtr in law)   ADL Assistance Independent   Falls in the last 6 months 1 to 4  (reports 1 fall in shower 2* slippery bath mat)   Vocational On disability  (2* COPD)   Comments (+)drives   Restrictions/Precautions   Other Precautions Fall Risk;Telemetry   General   Additional Pertinent History pt presents to [facility] c L UE numbness  admitted to r/o acute CVA  CTB (-) for acute ischemia; MRI noted small foci of acute ischemia R precentral/postcentral gyri  PT consulted for mobility + d/c planning  up + OOB as tolerated     Family/Caregiver Present No   Cognition   Orientation Level Oriented X4   RUE Assessment   RUE Assessment WFL   LUE Assessment   LUE Assessment WFL   RLE Assessment   RLE Assessment WFL  (4+/5)   LLE Assessment   LLE Assessment WFL  (4+/5)   Coordination   Movements are Fluid and Coordinated 1   Sensation X  (L UE impaired)   Light Touch   RLE Light Touch Grossly intact   LLE Light Touch Grossly intact   Proprioception   RLE Proprioception Grossly intact   LLE Proprioception Grossly Intact   Bed Mobility   Supine to Sit 6  Modified independent   Additional items HOB elevated   Transfers   Sit to Stand 5  Supervision   Additional items Verbal cues   Stand to Sit 5  Supervision   Additional items Verbal cues   Ambulation/Elevation   Gait pattern Inconsistent [place];Decreased foot clearance  (lateral sway)   Gait Assistance 5  Supervision   Additional items Verbal cues   Assistive Device None   Distance 150'   Balance   Static Sitting Good   Dynamic Sitting Good   Static Standing Fair +   Dynamic Standing Fair   Ambulatory Fair   Activity Tolerance   Activity Tolerance Patient limited by fatigue   Nurse Made Aware Ayse   Assessment   Prognosis Good   Problem List Decreased strength;Decreased endurance; Impaired balance;Decreased mobility; Impaired sensation;Obesity   Assessment pt is a 61y/o f who presents to [facility] c L UE numbness  currently undergoing workup for acute CVA  CTB (-) for acute ischemia; MRI noted small foci of acute ischemia R precentral/postcentral gyri  pt pending carotid US  PMH significant for DM 2, HTN, hyperlipidemia, COPD + hypothyroidism  at baseline, pt (I) c functional mobility  resides c son + dtr in law in ranch home c 2 SANDRA  pt on disability + cont to drive  currently presents c deficits in strength, balance, sensation, gait quality + activity tolerance noted in PT exam above  Barthel Index 80/100  noted impaired sensation L UE vs R UE  ambulated distance of 150' c (S) demonstrating gait deviations above  pt would benefit from skilled PT to maximize functional mobility + return to PLOF  upon d/c, anticipate home c no PT needs  pt encouraged to ambulate c nsg staff as tolerated  PT eval of high complexity 2* unstable med status c pt cont to undergo workup for acute CVA; carotid US pending  pt c multiple co-morbidities + decline in mobility from baseline c noted deficits above  resides in ranch home c 2 SANDRA + cont to report L UE numbness  Barriers to Discharge Inaccessible home environment   Goals   Patient Goals "to go home"  STG Expiration Date 01/02/18   Short Term Goal #1 1   increase strength 1/2 grade to improve overall functional mobility, 2  safely perform transfers (I) to utilize bathroom, 3  ambulate 300' (I) to safely ambulate in community, 5  negotiate 2 stairs (I) to safely enter home   Plan   Treatment/Interventions Functional transfer training;LE strengthening/ROM; Elevations; Therapeutic exercise; Endurance training;Patient/family training;Bed mobility;Gait training;Spoke to nursing   PT Frequency 2-3x/wk   Recommendation   Recommendation Home with family support   PT - OK to Discharge Yes   Modified Yefri Scale   Modified [place] Scale 1   Barthel Index   Feeding 10   Bathing 5   Grooming Score 5   Dressing Score 10   Bladder Score 10   Bowels Score 10   Toilet Use Score 10   Transfers (Bed/Chair) Score 10   Mobility (Level Surface) Score 10   Stairs Score 0   Barthel Index Score 80     Shruthi Penny, PT

## 2017-12-28 NOTE — ASSESSMENT & PLAN NOTE
· Present on admission, numbness of left forearm and left hand  · Workup to rule out acute stroke, includes MRI, MRA and CT CT of the brain done no acute findings  · Carotid ultrasound pending  · Neurology following recommendations appreciated  · Continue to monitor risk factors of stroke, continue Lipitor, aspirin

## 2017-12-29 VITALS
OXYGEN SATURATION: 98 % | HEART RATE: 82 BPM | TEMPERATURE: 98.1 F | BODY MASS INDEX: 40.05 KG/M2 | HEIGHT: 65 IN | DIASTOLIC BLOOD PRESSURE: 62 MMHG | RESPIRATION RATE: 18 BRPM | SYSTOLIC BLOOD PRESSURE: 123 MMHG | WEIGHT: 240.4 LBS

## 2017-12-29 PROBLEM — N39.0 UTI (URINARY TRACT INFECTION): Status: ACTIVE | Noted: 2017-12-29

## 2017-12-29 PROBLEM — R29.898 WEAKNESS OF LEFT ARM: Status: ACTIVE | Noted: 2017-12-29

## 2017-12-29 PROBLEM — I63.9 ACUTE CVA (CEREBROVASCULAR ACCIDENT) (HCC): Status: ACTIVE | Noted: 2017-12-29

## 2017-12-29 LAB
GLUCOSE SERPL-MCNC: 167 MG/DL (ref 65–140)
GLUCOSE SERPL-MCNC: 185 MG/DL (ref 65–140)

## 2017-12-29 PROCEDURE — 82948 REAGENT STRIP/BLOOD GLUCOSE: CPT

## 2017-12-29 RX ORDER — CEPHALEXIN 500 MG/1
500 CAPSULE ORAL 4 TIMES DAILY
Qty: 28 CAPSULE | Refills: 0 | Status: SHIPPED | OUTPATIENT
Start: 2017-12-29 | End: 2018-01-05

## 2017-12-29 RX ORDER — ASPIRIN 325 MG
325 TABLET ORAL DAILY
Qty: 30 TABLET | Refills: 0 | Status: SHIPPED | OUTPATIENT
Start: 2017-12-30

## 2017-12-29 RX ORDER — ATORVASTATIN CALCIUM 40 MG/1
40 TABLET, FILM COATED ORAL EVERY EVENING
Qty: 30 TABLET | Refills: 0 | Status: SHIPPED | OUTPATIENT
Start: 2017-12-29 | End: 2018-02-27

## 2017-12-29 RX ADMIN — FAMOTIDINE 20 MG: 20 TABLET, FILM COATED ORAL at 08:49

## 2017-12-29 RX ADMIN — CHOLECALCIFEROL TAB 25 MCG (1000 UNIT) 1000 UNITS: 25 TAB at 08:48

## 2017-12-29 RX ADMIN — BISOPROLOL FUMARATE 10 MG: 5 TABLET ORAL at 08:48

## 2017-12-29 RX ADMIN — CEFAZOLIN SODIUM 1000 MG: 1 SOLUTION INTRAVENOUS at 11:45

## 2017-12-29 RX ADMIN — FLUTICASONE PROPIONATE AND SALMETEROL 1 PUFF: 50; 250 POWDER RESPIRATORY (INHALATION) at 08:59

## 2017-12-29 RX ADMIN — ASPIRIN 325 MG: 325 TABLET ORAL at 08:48

## 2017-12-29 RX ADMIN — HEPARIN SODIUM 5000 UNITS: 5000 INJECTION, SOLUTION INTRAVENOUS; SUBCUTANEOUS at 06:01

## 2017-12-29 RX ADMIN — HYDROCHLOROTHIAZIDE 6.25 MG: 12.5 TABLET ORAL at 08:48

## 2017-12-29 RX ADMIN — POTASSIUM CHLORIDE 20 MEQ: 1500 TABLET, EXTENDED RELEASE ORAL at 08:49

## 2017-12-29 RX ADMIN — Medication 500 MG: at 08:48

## 2017-12-29 RX ADMIN — INSULIN LISPRO 1 UNITS: 100 INJECTION, SOLUTION INTRAVENOUS; SUBCUTANEOUS at 12:10

## 2017-12-29 RX ADMIN — LEVOTHYROXINE SODIUM 150 MCG: 150 TABLET ORAL at 06:01

## 2017-12-29 RX ADMIN — FUROSEMIDE 20 MG: 20 TABLET ORAL at 08:49

## 2017-12-29 RX ADMIN — INSULIN LISPRO 1 UNITS: 100 INJECTION, SOLUTION INTRAVENOUS; SUBCUTANEOUS at 08:51

## 2017-12-29 NOTE — PLAN OF CARE
Problem: DISCHARGE PLANNING - CARE MANAGEMENT  Goal: Discharge to post-acute care or home with appropriate resources  INTERVENTIONS:  - Conduct assessment to determine patient/family and health care team treatment goals, and need for post-acute services based on payer coverage, community resources, and patient preferences, and barriers to discharge  - Address psychosocial, clinical, and financial barriers to discharge as identified in assessment in conjunction with the patient/family and health care team  - Arrange appropriate level of post-acute services according to patient's   needs and preference and payer coverage in collaboration with the physician and health care team  - Communicate with and update the patient/family, physician, and health care team regarding progress on the discharge plan  - Arrange appropriate transportation to post-acute venues   Outcome: Completed Date Met: 12/29/17  CM met with pt at bedside  Pt to be discharged home with son Chiara Garland transporting  IMM reviewed with pt  No questions  Copy to pt and copy to MR for scanning  Pt is refusing any  services

## 2017-12-29 NOTE — SOCIAL WORK
CM met with pt at bedside  Pt to be discharged home with virgie Michael transporting  IMM reviewed with pt  No questions  Copy to pt and copy to MR for scanning  Pt is refusing any HH services

## 2017-12-29 NOTE — DISCHARGE SUMMARY
[facility]    Patient Information: Dona Schaefer 58 y o  female MRN: [id number]  Unit/Bed#: -01 Encounter: [id number]    Discharging Physician / Practitioner: CONSUELO Dang  PCP: Harlene Kanner  Admission Date: 12/27/2017  Discharge Date: 12/29/17    Reason for Admission:  Left arm numbness  Discharge diagnosis:  Acute CVA likely secondary to small vessel disease  Left arm numbness:     Discharge Diagnoses:     Principal Problem:    Numbness  Active Problems:    Type 2 diabetes mellitus (HCC)    Hypertension    Hyperlipidemia    COPD (chronic obstructive pulmonary disease) (HCC)    Hypothyroidism    Morbid obesity ([address] )    UTI (urinary tract infection)    Cerebrovascular accident (CVA) ([address] )    Weakness of left arm  Resolved Problems:    * No resolved hospital problems  *      Consultations During Hospital Stay:  · Neurology    Procedures Performed:     · CT of the brain  · MRI  · MRA  · Carotid ultrasound:     Significant Findings:       Numbness    Type 2 diabetes mellitus ([address] )    Hypertension    Hyperlipidemia    COPD (chronic obstructive pulmonary disease) (HCC)    Hypothyroidism    Morbid obesity ([address] )    UTI (urinary tract infection)    Cerebrovascular accident (CVA) ([address] )    Weakness of left arm    Incidental Findings:   · None    Test Results Pending at Discharge (will require follow up): · None     Outpatient Tests Requested:  · Follow up with PCP repeat UA  Follow up outpatient OT    Complications:  Left forearm, left hand numbness  Hospital Course:     Dona Schaefer is a 58 y o  female patient who originally presented to the hospital on 12/27/2017 due to left arm numbness  Numbness:  Likely secondary to acute CVA likely secondary to small-vessel disease:  Neurology was consulted for further evaluation and management  Patient was admitted and started on stroke pathway    CT scan of the brain initially was unremarkable, MRI done showed evidence of tiny diffusion defects affecting the precentral and postcentral cortex  MRA of the brain done showed no evidence of any significant stenosis or any intracranial aneurysm  Carotid ultrasound:  Right less than 50% stenosis left no evidence of stenosis  ECHO done Systolic function was normal  Ejection fraction was estimated to be 60 %  There were no regional wall motion abnormalities  Plan of care discussed in details with patient prescription given for aspirin 325 mg daily as recommended by Neurology  Prescription also given to patient for outpatient occupational therapy  Case management consulted for further coordination  Type 2 diabetes mellitus ([facility] [address] ) with morbid obesity:  Hemoglobin A1c 7 5  Patient advised to follow up with PCP for further management  No change in medications regiment  Nutrition consult done in patient  Patient encourage to enhance improved lifestyle modification, weight loss and low carb diet  She did verbalize understanding  UTI (urinary tract infection):  Patient asymptomatic  She denies dysuria, flank pain or suprapubic abdominal pain  Urine culture reveals >100,000 cfu/ml Gram Negative Jeison Enteric Like  Patient was started on Ancef transition to Keflex for 7 days  Patient encouraged to follow up with PCP to reassess urine for UTI resolution  I stressed the importance of compliance with this regiment as patient was asymptomatic  Explained to patient the plan is to prevent worsening of this infection  She did verbalize understanding  Information provided to patient with above findings, including diabetic diet  Patient will follow up with PCP and Neurology  Reviewed and discussed findings of all diagnostics with patient's, including ongoing risk factors and preventative measures  On my exam and during discharge patient's son was at bedside      Condition at Discharge: stable     Discharge Day Visit / Exam:     Subjective:  "I feel better, numbness has improved detention down my forearm into my hands '  on discharge, patient is awake alert oriented x3 she is ambulating in the room and in the halls  She denies dizziness, lightheadedness, the visual or speech disturbance, other than left forearm to hand numbness no other neurological symptoms identified  She denies chest pain, shortness of breath, fever, chills or dysuria  Patient is stable for discharge  Vitals: Blood Pressure: 123/62 (12/29/17 1000)  Pulse: 82 (12/29/17 1000)  Temperature: 98 1 °F (36 7 °C) (12/29/17 1000)  Temp Source: Oral (12/29/17 1000)  Respirations: 18 (12/29/17 1000)  Height: 5' 5" (165 1 cm) (12/28/17 1030)  Weight - Scale: 109 kg (240 lb 6 4 oz) (12/27/17 1815)  SpO2: 98 % (12/29/17 1000)    Exam:   Physical Exam   Constitutional: She is oriented to person, place, and time  She appears well-nourished  No distress  Obese   HENT:   Head: Normocephalic and atraumatic  Neck: Normal range of motion  Neck supple  Cardiovascular: Normal rate, regular rhythm and normal heart sounds  No murmur heard  Pulmonary/Chest: Effort normal and breath sounds normal  No respiratory distress  She exhibits no tenderness  Abdominal: Soft  Bowel sounds are normal  There is no tenderness  Genitourinary:   Genitourinary Comments: Denies dysuria   Musculoskeletal: Normal range of motion  She exhibits no edema or tenderness  Neurological: She is alert and oriented to person, place, and time  Left forearm, left hand numbness  Bilateral upper extremity equal strength  Skin: Skin is warm and dry  She is not diaphoretic  Psychiatric: She has a normal mood and affect  Her behavior is normal    Nursing note and vitals reviewed  Discharge instructions/Information to patient and family:   See after visit summary for information provided to patient and family  Provisions for Follow-Up Care:  See after visit summary for information related to follow-up care and any pertinent home health orders  Disposition:     Home    For Discharges to [facility] SNF:   · Not Applicable to this Patient - Not Applicable to this Patient    Planned Readmission:  Possible     Discharge Statement:  I spent 60 minutes discharging the patient  This time was spent on the day of discharge  I had direct contact with the patient on the day of discharge  Greater than 50% of the total time was spent examining patient, answering all patient questions, arranging and discussing plan of care with patient as well as directly providing post-discharge instructions  Additional time then spent on discharge activities  Discharge Medications:  See after visit summary for reconciled discharge medications provided to patient and family  ** Please Note: Dragon 360 Dictation voice to text software may have been used in the creation of this document   **

## 2017-12-29 NOTE — PLAN OF CARE
Activity Intolerance/Impaired Mobility     Mobility/activity is maintained at optimum level for patient Progressing        Communication Impairment     Ability to express needs and understand communication [address] Discharge to home or other facility with appropriate resources Progressing        DISCHARGE PLANNING - CARE MANAGEMENT     Discharge to post-acute care or home with appropriate resources Progressing        Knowledge Deficit     Patient/family/caregiver demonstrates understanding of disease process, treatment plan, medications, and discharge instructions Progressing        METABOLIC, FLUID AND ELECTROLYTES - ADULT     Electrolytes maintained within normal limits Progressing     Fluid balance maintained Progressing     Glucose maintained within target range Progressing        Neurological Deficit     Neurological status is stable or improving Progressing        Nutrition     Nutrition/Hydration status is improving Progressing        Nutrition/Hydration-ADULT     Nutrient/Hydration intake appropriate for improving, restoring or maintaining nutritional needs Progressing        Potential for Aspiration     Non-ventilated patient's risk of aspiration is minimized Progressing     Ventilated patient's risk of aspiration is minimized Progressing        Potential for Falls     Patient will remain free of falls Progressing        SAFETY ADULT     Patient will remain free of falls Progressing     Maintain or return to baseline ADL function Progressing     Maintain or return mobility status to optimal level Progressing

## 2017-12-29 NOTE — DISCHARGE INSTRUCTIONS
Urinary Tract Infection in Women   WHAT YOU NEED TO KNOW:   A urinary tract infection (UTI) is caused by bacteria that get inside your urinary tract  Most bacteria that enter your urinary tract come out when you urinate  If the bacteria stay in your urinary tract, you may get an infection  Your urinary tract includes your kidneys, ureters, bladder, and urethra  Urine is made in your kidneys, and it flows from the ureters to the bladder  Urine leaves the bladder through the urethra  A UTI is more common in your lower urinary tract, which includes your bladder and urethra  DISCHARGE INSTRUCTIONS:   Return to the emergency department if:   · You are urinating very little or not at all  · You have a high fever with shaking chills  · You have side or back pain that gets worse  Contact your healthcare provider if:   · You have a fever  · You do not feel better after 2 days of taking antibiotics  · You are vomiting  · You have questions or concerns about your condition or care  Medicines:   · Antibiotics  help fight a bacterial infection  · Medicines  may be given to decrease pain and burning when you urinate  They will also help decrease the feeling that you need to urinate often  These medicines will make your urine orange or red  · Take your medicine as directed  Contact your healthcare provider if you think your medicine is not helping or if you have side effects  Tell him or her if you are allergic to any medicine  Keep a list of the medicines, vitamins, and herbs you take  Include the amounts, and when and why you take them  Bring the list or the pill bottles to follow-up visits  Carry your medicine list with you in case of an emergency  Follow up with your healthcare provider as directed:  Write down your questions so you remember to ask them during your visits  Prevent another UTI:   · Empty your bladder often    Urinate and empty your bladder as soon as you feel the need  Do not hold your urine for long periods of time  · Wipe from front to back after you urinate or have a bowel movement  This will help prevent germs from getting into your urinary tract through your urethra  · Drink liquids as directed  Ask how much liquid to drink each day and which liquids are best for you  You may need to drink more liquids than usual to help flush out the bacteria  Do not drink alcohol, caffeine, or citrus juices  These can irritate your bladder and increase your symptoms  Your healthcare provider may recommend cranberry juice to help prevent a UTI  · Urinate after you have sex  This can help flush out bacteria passed during sex  · Do not douche or use feminine deodorants  These can change the chemical balance in your vagina  · Change sanitary pads or tampons often  This will help prevent germs from getting into your urinary tract  · Do pelvic muscle exercises often  Pelvic muscle exercises may help you start and stop urinating  Strong pelvic muscles may help you empty your bladder easier  Squeeze these muscles tightly for 5 seconds like you are trying to hold back urine  Then relax for 5 seconds  Gradually work up to squeezing for 10 seconds  Do 3 sets of 15 repetitions a day, or as directed  © 2017 [address] [facility] Information is for End User's use only and may not be sold, redistributed or otherwise used for commercial purposes  All illustrations and images included in CareNotes® are the copyrighted property of [employer]  or Bruno Simmons  The above information is an  only  It is not intended as medical advice for individual conditions or treatments  Talk to your doctor, nurse or pharmacist before following any medical regimen to see if it is safe and effective for you        Meal Planning with Diabetes Exchanges   AMBULATORY CARE:   Diabetes exchanges  are servings of food that contain similar amounts of carbohydrate, fat, protein, and calories within a food group  The exchanges can be used to develop a healthy meal plan that helps to keep your blood sugar within the recommended levels  A meal plan with the right amount of carbohydrates is especially important  Your blood sugar naturally rises after you eat carbohydrates  Too many carbohydrates in 1 meal or snack can raise your blood sugar level  Carbohydrates are found in starches, fruit, milk, yogurt, and sweets  How to create a meal plan with exchanges:  A dietitian will work with you to develop a healthy meal plan that is right for you  This meal plan will include the amount of exchanges you can have from each food group throughout the day  Follow your meal plan by keeping track of the amount of exchanges you eat for each meal and snack  Your meal plan will be based on your age, weight, blood sugar levels, medicine, and activity level  Starch food group exchanges:  Each exchange below contains about 15 grams of carbohydrate , 3 grams of protein, 1 gram of fat, and 80 calories  · 1 ounce of white, whole wheat or rye bread (1 slice)    · 1 ounce of bagel (about ¼ of a bagel)    · 1 6-inch flour or corn tortilla or 1 4-inch pancake (about ¼ inch thick)    · ? cup of cooked pasta or rice    · ¾ cup of dry, ready-to-eat cereal with no sugar added     · ½ cup of cooked cereal, such as oatmeal    · 3 ryan cracker squares or 8 animal crackers    · 6 saltine-type crackers or     · 3 cups of popcorn or ¾ ounce of pretzels     · Starchy vegetables and cooked legumes:      ¨ ½ cup of corn, green peas, sweet potatoes, or mashed potatoes     ¨ ¼ of a large baked potato     ¨ 1 cup of acorn, butternut squash, or pumpkin     ¨ ½ cup of beans, lentils, or peas (such as azar, kidney, or black-eyed)    ¨ ? cup of lima beans  Fruit group exchanges:  Each exchange contains about 15 grams of carbohydrate  and 60 calories    · 1 small (4 ounce) apple, banana orange, or nectarine    · ½ cup of canned or fresh fruit    · ½ cup (4 ounces) of unsweetened fruit juice    · 2 tablespoons of dried fruit  Milk group exchanges:  Each exchange contains about 12 grams of carbohydrate  and 8 grams of protein  The amount of fat and calories in each serving depends on the type of milk (such as whole, low-fat, or fat-free)  · 1 cup fat-free or low-fat milk    · ¾ cup of plain, nonfat yogurt    · 1 cup fat-free, flavored yogurt with artificial (no calorie) sweetener  Non-starchy vegetable group exchanges:  Each exchange contains about 5 grams of carbohydrate , 2 grams of protein, and 25 calories  Examples include beets, broccoli, cabbage, carrots, cauliflower, cucumber, mushrooms, tomatoes, and zucchini  · ½ cup of cooked vegetables or 1 cup of raw vegetables     · ½ cup of vegetable juice  Meat and meat substitute group exchanges:  Each exchange of a lean meat  listed below contains about 7 grams of protein, 0 to 3 grams of fat, and 45 calories  The meat and meat substitutes food group does not contain any carbohydrates  Medium and high-fat meats have more calories  · 1 ounce of chicken or turkey without skin, or 1 ounce of fish (not breaded or fried)     · 1 ounce of lean beef, pork, or lamb     · 1-inch cube or 1 ounce of low-fat cheese     · 2 egg whites or ¼ cup of egg substitute     · ½ cup of tofu  Sweets, desserts, and other carbohydrate group exchanges:   · Sweets and other desserts:  Each exchange has about 15 grams of carbohydrate       ¨ 1 ounce of tone food cake or 2-inch square cake (unfrosted)    ¨ 2 small cookies     ¨ ½ cup of sugar-free, fat-free ice cream    ¨ 1 tablespoon of syrup, jam, jelly, table sugar, or honey    · Combination foods:     ¨ 1 cup of an entrée, such as lasagna, spaghetti with meatballs, macaroni and cheese, and chili with beans (each serving counts as 2 carbohydrate exchanges )     ¨ 1 cup of tomato or vegetable beef soup (each serving counts as 1 carbohydrate exchange )  Fat group exchanges:  Each exchange contains 5 grams of fat and 45 calories  · 1 teaspoon of oil (such as canola, olive, or corn oil)     · 6 almonds or cashews, 10 peanuts, or 4 pecan halves     · 2 tablespoons of avocado     · ½ tablespoon of peanut butter     · 1 teaspoon of regular margarine or 2 teaspoons of low-fat margarine     · 1 teaspoon of regular butter or 1 tablespoon of low-fat butter     · 1 teaspoon of regular mayonnaise or 1 tablespoon of low-fat mayonnaise     · 1 tablespoon of regular salad dressing or 2 tablespoons of low-fat salad dressing  Free foods: The foods on this list are called free foods because they have very few calories  Free foods usually do not increase your blood sugar if you limit them  · 1 tablespoon of catsup or taco sauce     · ¼ cup of salsa     · 2 tablespoons of sugar-free syrup or 2 teaspoons of light jam or jelly     · 1 tablespoon of fat-free salad dressing     · 4 tablespoons of fat-free margarine or fat-free mayonnaise     · Sugar-free drinks: diet soda, sugar-free drink mixes, or mineral water     · Low-sodium bouillon or fat-free broth     · Mustard     · Seasonings such as spices, herbs, and garlic     · Sugar-free gelatin without added fruit  Other healthy nutrition guidelines:   · Eat more fiber  Choose foods that are good sources of fiber, such as fruits, vegetables, and whole grains  Cereals that contain 5 or more grams of fiber per serving are good sources of fiber  Legumes such as garbanzo, azar beans, kidney beans, and lentils are also good sources  · Limit fat  Ask your dietitian or healthcare provider how much fat you should eat each day  Choose foods low in fat, saturated fat, trans fat, and cholesterol  Examples include turkey or chicken without the skin, fish, lean cuts of meat, and beans  Low-fat dairy foods, such as low-fat or fat-free milk and low-fat yogurt are also good choices   Omega-3 fatty acids are healthy fats that are found in canola oil, soybean oil and fatty fish  [place], albacore tuna, and sardines are good sources of omega 3 fatty acids  Eat 2 servings of these types of fish each week  Do not eat fried fish  · Limit sugar  Sugar and sweets must be counted toward the carbohydrate exchanges that you can have within your meal plan  Limit sugar and sweets because they are usually also high in calories and fat  Eat smaller portions of sweets by sharing a dessert or asking for a child-size portion at a restaurant  · Limit sodium  (salt) to about 2,300 mg per day  You may need to eat even less sodium if you have certain medical conditions  Foods high in sodium include soy sauce, potato chips, and soup  · Limit alcohol  Ask your healthcare provider if it is safe for you to drink alcohol  If alcohol is safe for you to have, eat a meal when you drink alcohol  If you drink alcohol on an empty stomach, your blood sugar may drop to a low level  Women should limit alcohol to 1 drink per day  Men should limit alcohol to 2 drinks per day  A drink of alcohol is 5 ounces of wine, 12 ounces of beer, or 1½ ounces of liquor  Other ways to manage your diabetes:   · Control your blood sugar level  Test your blood sugar level regularly and keep a record of the results  Ask your healthcare provider when and how often to test your blood sugar  You may need to check your blood sugar level at least 3 times each day  · Talk to your healthcare provider about your weight  Ask if you need to lose weight, and how much you need to lose  If you are overweight, you may need to make other changes to lose weight  Ask your healthcare provider to help you create a weight loss program      · Exercise  can help to control your blood sugar levels and decrease your risk of heart disease  It can also help you lose or maintain your weight  Get at least 30 minutes of exercise, 5 times each week  Do resistance training (using weights) 2 times each week   Do not sit for longer than 90 minutes  Work with your healthcare provider to plan the best exercise program for you  Contact your healthcare provider if:   · You have high blood sugar levels during a certain time of day, or almost all of the time  · You often have low blood sugar levels  · You have questions or concerns about your condition or care  © 2017 [address] Information is for End User's use only and may not be sold, redistributed or otherwise used for commercial purposes  All illustrations and images included in CareNotes® are the copyrighted property of [employer]  or Bruno Simmons  The above information is an  only  It is not intended as medical advice for individual conditions or treatments  Talk to your doctor, nurse or pharmacist before following any medical regimen to see if it is safe and effective for you  Ischemic Stroke, Ambulatory Care   GENERAL INFORMATION:   An ischemic stroke  happens when blood is suddenly blocked and cannot flow to your brain  The block is usually caused by a blood clot that gets stuck in a narrow blood vessel  When oxygen cannot get to an area of the brain, tissue in that area may get damaged  Damage to an area of the brain causes loss of body functions controlled by that area  Common symptoms include the following:   · Severe headache    · Blurred or double vision, or vision loss    · Numbness, tingling, weakness, or paralysis on 1 side of your body    · Trouble walking or communicating    · Dizziness, confusion, or fainting  How can I tell if someone is having a stroke? Know the F A S T  test to recognize the signs of a stroke:  · F = Face:  Ask the person to smile  Drooping on one side of the mouth or face is a sign of a stroke  · A = Arms:  Ask the person to raise both arms  One arm that slowly comes back down or cannot be raised is a sign of a stroke      · S = Speech:  Ask the person to repeat a simple sentence that you say first  Speech that is slurred or sounds strange is a sign of a stroke  · T = Time:  Call 911 if you see any of these signs  This is an emergency  Seek immediate care for the following symptoms:   · Seizure, double vision, or vision loss    · Nose bleed, coughing up blood, or bleeding from your rectum     · An arm or leg feels warm, tender, and painful    · Chest pain that spreads to your arms, jaw, or back    · Sudden dizziness, feeling lightheaded, and shortness of breath    · Weakness or numbness in your arm, leg, or face    · Confusion and problems speaking or understanding speech    · Severe headache or loss of balance or coordination  Treatment for ischemic stroke  may include any of the following:  · Antiplatelets , such as aspirin, help prevent blood clots  Take your antiplatelet medicine exactly as directed  These medicines make it more likely for you to bleed or bruise  If you are told to take aspirin, do not take acetaminophen or ibuprofen instead  · Anticoagulants    are a type of blood thinner medicine that helps prevent clots  Clots can cause strokes, heart attacks, and death  These medicines may cause you to bleed or bruise more easily  ¨ Watch for bleeding from your gums or nose  Watch for blood in your urine and bowel movements  Use a soft washcloth and a soft toothbrush  If you shave, use an electric razor  Avoid activities that can cause bruising or bleeding  ¨ Tell your healthcare provider about all medicines you take because many medicines cannot be used with anticoagulants  Do not start or stop any medicines unless your healthcare provider tells you to  Tell your dentist and other healthcare providers that you take anticoagulants  Wear a bracelet or necklace that says you take this medicine  ¨ You will need regular blood tests so your healthcare provider can decide how much medicine you need  Take anticoagulants exactly as directed   Tell your healthcare provider right away if you forget to take the medicine, or if you take too much  ¨ If you take warfarin, some foods can change how your blood clots  Do not make major changes to your diet while you take warfarin  Warfarin works best when you eat about the same amount of vitamin K every day  Vitamin K is found in green leafy vegetables, broccoli, grapes, and other foods  Ask for more information about what to eat when you take warfarin  · Other medicines  may be needed to treat diabetes, high cholesterol, or high blood pressure  You may also need medicine to decrease the pressure in your brain, reduce pain, or prevent seizures  Manage and prevent ischemic stroke:   · Go to therapy  Physical therapists help strengthen your arms, legs, and hands  You learn exercises to improve your balance and movement to decrease your risk of falling  Occupational therapists teach you new ways to do daily activities, such as getting dressed  A speech therapist helps you relearn or improve your ability to talk and swallow  · Eat a variety of healthy foods  Healthy foods include fruits, vegetables, whole-grain breads, low-fat dairy products, beans, lean meats, and fish  Eat foods that are low in fat, cholesterol, salt, and sugar  Eat at least 5 servings of fruits and vegetables each day  Eat foods that are high in potassium, such as potatoes and bananas  · Maintain a healthy weight  Weight loss can decrease your blood pressure and your risk of stroke  Ask your healthcare provider how much you should weigh and how to lose weight safely  Ask how often you should exercise and which exercises to do  · Do not drink alcohol  Alcohol thins the blood, which increases your risk of hemorrhagic stroke  · Do not use street drugs or smoke cigarettes  Your risk of another stroke increases if you use drugs such as cocaine, or you smoke cigarettes  Ask your healthcare provider if you need help quitting    Follow up with your healthcare provider as directed:  Write down your questions so you remember to ask them during your visits  CARE AGREEMENT:   You have the right to help plan your care  Learn about your health condition and how it may be treated  Discuss treatment options with your caregivers to decide what care you want to receive  You always have the right to refuse treatment  The above information is an  only  It is not intended as medical advice for individual conditions or treatments  Talk to your doctor, nurse or pharmacist before following any medical regimen to see if it is safe and effective for you  © 2014 [address] is for End User's use only and may not be sold, redistributed or otherwise used for commercial purposes  All illustrations and images included in CareNotes® are the copyrighted property of [employer]  or Bruno Simmons

## 2017-12-30 LAB — BACTERIA UR CULT: ABNORMAL

## 2018-02-27 ENCOUNTER — OFFICE VISIT (OUTPATIENT)
Dept: NEUROLOGY | Facility: CLINIC | Age: 63
End: 2018-02-27
Payer: MEDICARE

## 2018-02-27 VITALS
BODY MASS INDEX: 37.44 KG/M2 | HEART RATE: 83 BPM | DIASTOLIC BLOOD PRESSURE: 74 MMHG | SYSTOLIC BLOOD PRESSURE: 134 MMHG | WEIGHT: 225 LBS

## 2018-02-27 DIAGNOSIS — I63.311 CEREBROVASCULAR ACCIDENT (CVA) DUE TO THROMBOSIS OF RIGHT MIDDLE CEREBRAL ARTERY (HCC): Primary | ICD-10-CM

## 2018-02-27 DIAGNOSIS — I10 ESSENTIAL HYPERTENSION: ICD-10-CM

## 2018-02-27 DIAGNOSIS — E78.00 PURE HYPERCHOLESTEROLEMIA: ICD-10-CM

## 2018-02-27 PROCEDURE — 99214 OFFICE O/P EST MOD 30 MIN: CPT | Performed by: PSYCHIATRY & NEUROLOGY

## 2018-02-27 NOTE — PROGRESS NOTES
Patient ID: Jacquie  is a 58 y o  female  Right-handed and was recently hospitalized for a left MCA CVA  Assessment:  1  Cerebrovascular accident (CVA) due to thrombosis of right middle cerebral artery ([facility] [address] )     2  Essential hypertension     3  Pure hypercholesterolemia         Plan: At this time a lengthy discussion occurred with the patient regarding her stroke findings, her good recovery from the CVA except for mild residual sensory symptoms in the left thumb and hand, stroke prevention was also discussed at length, she will continue with her present medications and will return back to see me in 6 months  Subjective:    HPI patient is here for a follow-up visit and in December had an event where she developed sudden onset of left upper extremity weakness and numbness as she woke up in the morning  Patient was seen in the hospital by myself and detected to have a left MCA precentral and postcentral diffusion defects, which were punctate and subsequently her symptoms also showed improvement and patient was worked up extensively with an MRA of the brain, carotid ultrasound 2D echo and was advised to take aspirin 325 milligrams daily in addition to lipid-lowering agents  She also has a history of hypertension, diabetes and hyperlipidemia and since her hospitalization has followed up with her primary physician and denies any recurrent symptoms  She claims her blood pressure is also under adequate control at this time and so is her diabetes  Patient denies any side effects from a medications  Overall she has been doing well and denies any new neurological symptoms      HISTORY:    Past Medical History:   Diagnosis Date    COPD (chronic obstructive pulmonary disease) ([facility] [address] )     [facility]     Disease of thyroid gland     Hyperlipidemia     Hypertension        Past Surgical History:   Procedure Laterality Date    APPENDECTOMY      CHOLECYSTECTOMY      HYSTERECTOMY      TONSILLECTOMY         History reviewed  No pertinent family history  reports that she has quit smoking  She has never used smokeless tobacco  She reports that she does not drink alcohol or use drugs  ALLERGIES:  Patient has no known allergies  Current Outpatient Prescriptions:     albuterol (PROVENTIL HFA,VENTOLIN HFA) 90 mcg/act inhaler, Inhale 2 puffs every 6 (six) hours as needed for wheezing, Disp: , Rfl:     aspirin 325 mg tablet, Take 1 tablet by mouth daily, Disp: 30 tablet, Rfl: 0    cholecalciferol (VITAMIN D3) 1,000 units tablet, Take 1,000 Units by mouth daily, Disp: , Rfl:     fluticasone (FLONASE) 50 mcg/act nasal spray, 1 spray into each nostril daily, Disp: , Rfl:     furosemide (LASIX) 20 mg tablet, Take 40 mg by mouth daily as needed  , Disp: , Rfl:     gemfibrozil (LOPID) 600 mg tablet, Take 600 mg by mouth 2 (two) times a day before meals, Disp: , Rfl:     levothyroxine 150 mcg tablet, Take 150 mcg by mouth daily, Disp: , Rfl:     Liraglutide (VICTOZA) 18 MG/3ML SOPN, Inject 1 2 mg under the skin  , Disp: , Rfl:     magnesium gluconate (MAGONATE) 500 mg tablet, Take 500 mg by mouth 2 (two) times a day, Disp: , Rfl:     meloxicam (MOBIC) 7 5 mg tablet, Take 7 5 mg by mouth daily, Disp: , Rfl:     metFORMIN (GLUCOPHAGE) 1000 MG tablet, Take 1,000 mg by mouth 2 (two) times a day with meals, Disp: , Rfl:     montelukast (SINGULAIR) 10 mg tablet, Take 10 mg by mouth daily at bedtime, Disp: , Rfl:     potassium chloride (K-DUR,KLOR-CON) 20 mEq tablet, Take 20 mEq by mouth daily, Disp: , Rfl:     ranitidine (ZANTAC) 150 mg tablet, Take 150 mg by mouth 2 (two) times a day, Disp: , Rfl:     simvastatin (ZOCOR) 40 mg tablet, Take 40 mg by mouth daily at bedtime, Disp: , Rfl:      Objective:    Blood pressure 134/74, pulse 83, weight 102 kg (225 lb)  Physical Exam    Neurological Exam  Patient is alert awake oriented, high functions are intact, speech is fluent   No evidence of any aphasia or dysarthria  Cranial nerve examination reveals visual fields are full to threat, pupils equal and reactive, extraocular movements intact, fundi showed sharp disc margins, sensation in the V1 V2 V3 distribution is symmetric, no obvious facial asymmetry noted, tongue is midline and gag is adequate  Motor examination reveals normal tone and bulk, no evidence of any drift to the outstretched extremities, strength is 5/5 preserved bilaterally in both upper and lower extremities, deep tendon reflexes are intact, toes are downgoing  Sensory examination to pinprick light touch proprioception and vibration is preserved bilaterally, patient does not extinguish double simultaneous stimuli  Coordination no evidence of any finger-to-nose dysmetria  Gait is normal based Romberg sign is negative  ROS:    Review of Systems   Constitutional: Negative  HENT: Negative  Eyes: Negative  Respiratory: Negative  Cardiovascular: Negative  Gastrointestinal: Negative  Endocrine: Negative  Genitourinary: Negative  Musculoskeletal: Negative  Skin: Negative  Allergic/Immunologic: Negative  Neurological: Positive for numbness  Hematological: Negative  Psychiatric/Behavioral: Negative

## 2018-09-04 ENCOUNTER — OFFICE VISIT (OUTPATIENT)
Dept: NEUROLOGY | Facility: CLINIC | Age: 63
End: 2018-09-04
Payer: COMMERCIAL

## 2018-09-04 VITALS
HEART RATE: 70 BPM | SYSTOLIC BLOOD PRESSURE: 126 MMHG | WEIGHT: 234 LBS | HEIGHT: 65 IN | BODY MASS INDEX: 38.99 KG/M2 | DIASTOLIC BLOOD PRESSURE: 86 MMHG

## 2018-09-04 DIAGNOSIS — Z86.73 HISTORY OF CEREBROVASCULAR ACCIDENT (CVA) FROM RIGHT CAROTID ARTERY OCCLUSION INVOLVING RIGHT MIDDLE CEREBRAL ARTERY TERRITORY: Primary | ICD-10-CM

## 2018-09-04 PROCEDURE — 99213 OFFICE O/P EST LOW 20 MIN: CPT | Performed by: PSYCHIATRY & NEUROLOGY

## 2018-09-04 RX ORDER — DIPHENOXYLATE HYDROCHLORIDE AND ATROPINE SULFATE 2.5; .025 MG/1; MG/1
1 TABLET ORAL DAILY
COMMUNITY

## 2018-09-04 RX ORDER — FERROUS SULFATE 325(65) MG
325 TABLET ORAL 3 TIMES WEEKLY
COMMUNITY

## 2018-09-04 NOTE — PROGRESS NOTES
Progress Note - Neurology   Mio Alegria 61 y o  female MRN: [id number]  Unit/Bed#:  Encounter: [id number]      Subjective:   Patient is here for a follow-up visit with a history of a right MCA CVA, hypertension, hyperlipidemia, diabetes mellitus, and remains on aspirin 325 mg daily  She claims her blood pressure, diabetes, is under adequate control and is also on lipid-lowering agents  1 week ago the patient was resting and noticed visual scotoma affecting the left eye lasting for about 10-15 minutes not followed by any headache or residual deficits and spontaneously resolved  She has had no recurrent symptoms since then  Patient otherwise denies any changes in her health since she last saw me 6 months ago  ROS:   Review of Systems   Constitutional: Negative  HENT: Negative  Eyes: Positive for visual disturbance  Respiratory: Positive for shortness of breath  Cardiovascular: Negative  Gastrointestinal: Negative  Endocrine: Negative  Genitourinary: Negative  Musculoskeletal: Negative  Skin: Negative  Allergic/Immunologic: Negative  Neurological: Negative  Hematological: Bruises/bleeds easily  Psychiatric/Behavioral: Negative  Vitals:   Vitals:    09/04/18 0814   BP: 126/86   Pulse: 70   ,Body mass index is 39 24 kg/m²      MEDS:      Current Outpatient Prescriptions:     albuterol (PROVENTIL HFA,VENTOLIN HFA) 90 mcg/act inhaler, Inhale 2 puffs every 6 (six) hours as needed for wheezing, Disp: , Rfl:     aspirin 325 mg tablet, Take 1 tablet by mouth daily, Disp: 30 tablet, Rfl: 0    Biotin 2 5 MG CAPS, Take 2 capsules by mouth daily, Disp: , Rfl:     cholecalciferol (VITAMIN D3) 1,000 units tablet, Take 1,000 Units by mouth daily, Disp: , Rfl:     ferrous sulfate 325 (65 Fe) mg tablet, Take 325 mg by mouth 3 (three) times a week, Disp: , Rfl:     fluticasone (FLONASE) 50 mcg/act nasal spray, 1 spray into each nostril daily, Disp: , Rfl:     furosemide (LASIX) 20 mg tablet, Take 40 mg by mouth daily as needed  , Disp: , Rfl:     gemfibrozil (LOPID) 600 mg tablet, Take 600 mg by mouth 2 (two) times a day before meals, Disp: , Rfl:     levothyroxine 150 mcg tablet, Take 125 mcg by mouth daily  , Disp: , Rfl:     Liraglutide (VICTOZA) 18 MG/3ML SOPN, Inject 1 2 mg under the skin  , Disp: , Rfl:     magnesium gluconate (MAGONATE) 500 mg tablet, Take 400 mg by mouth 2 (two) times a day  , Disp: , Rfl:     meloxicam (MOBIC) 7 5 mg tablet, Take 7 5 mg by mouth daily, Disp: , Rfl:     metFORMIN (GLUCOPHAGE) 1000 MG tablet, Take 1,000 mg by mouth 2 (two) times a day with meals, Disp: , Rfl:     montelukast (SINGULAIR) 10 mg tablet, Take 10 mg by mouth daily at bedtime, Disp: , Rfl:     multivitamin (THERAGRAN) TABS, Take 1 tablet by mouth daily, Disp: , Rfl:     potassium chloride (K-DUR,KLOR-CON) 20 mEq tablet, Take 20 mEq by mouth daily, Disp: , Rfl:     ranitidine (ZANTAC) 150 mg tablet, Take 150 mg by mouth 2 (two) times a day, Disp: , Rfl:     simvastatin (ZOCOR) 40 mg tablet, Take 40 mg by mouth daily at bedtime, Disp: , Rfl:   :    Physical Exam:  General appearance: alert, appears stated age and cooperative  Head: Normocephalic, without obvious abnormality, atraumatic    Neurologic:  On neurological examination patient has evidence of a mild old left facial weakness, left upper extremity drift, no focal weakness was noted, deep tendon reflex are 1+ bilaterally and no new sensory deficits were noted  Her gait is normal based and there is no evidence of any dysmetria  No bruits were appreciable in the neck  Lab Results: I have personally reviewed pertinent reports  Imaging Studies: I have personally reviewed pertinent reports  Assessment:  1  History of right MCA CVA  2  Visual scotoma, history of hypertension, diabetes, hyperlipidemia      Plan:  Patient is advised to call me if she experiences any recurrent episodes, was reassured that most likely these episode of visual scotoma scotoma could be related to optical migraine, however if she does have recurrent episodes will need further evaluation as well as ophthalmological evaluation  In the meanwhile she will continue with aspirin 325 mg daily and her other current medications  Home exercise program is encouraged patient will return back to see me in 6 months  9/4/2018,8:22 AM    Dictation voice to text software has been used in the creation of this document  Please consider this in light of any contextual or grammatical errors

## 2019-05-21 ENCOUNTER — OFFICE VISIT (OUTPATIENT)
Dept: NEUROLOGY | Facility: CLINIC | Age: 64
End: 2019-05-21
Payer: COMMERCIAL

## 2019-05-21 VITALS
DIASTOLIC BLOOD PRESSURE: 76 MMHG | SYSTOLIC BLOOD PRESSURE: 136 MMHG | WEIGHT: 227.8 LBS | HEIGHT: 65 IN | HEART RATE: 78 BPM | BODY MASS INDEX: 37.95 KG/M2

## 2019-05-21 DIAGNOSIS — I63.311 CEREBROVASCULAR ACCIDENT (CVA) DUE TO THROMBOSIS OF RIGHT MIDDLE CEREBRAL ARTERY (HCC): Primary | ICD-10-CM

## 2019-05-21 PROCEDURE — 99213 OFFICE O/P EST LOW 20 MIN: CPT | Performed by: PSYCHIATRY & NEUROLOGY

## 2019-12-03 ENCOUNTER — HOSPITAL ENCOUNTER (OUTPATIENT)
Dept: ULTRASOUND IMAGING | Facility: HOSPITAL | Age: 64
Discharge: HOME | End: 2019-12-03
Attending: PSYCHIATRY & NEUROLOGY
Payer: COMMERCIAL

## 2019-12-03 DIAGNOSIS — I63.311 CEREBROVASCULAR ACCIDENT (CVA) DUE TO THROMBOSIS OF RIGHT MIDDLE CEREBRAL ARTERY (HCC): ICD-10-CM

## 2019-12-03 PROCEDURE — 93880 EXTRACRANIAL BILAT STUDY: CPT | Performed by: SURGERY

## 2019-12-03 PROCEDURE — 93880 EXTRACRANIAL BILAT STUDY: CPT

## 2020-02-13 ENCOUNTER — TELEPHONE (OUTPATIENT)
Dept: NEUROLOGY | Facility: CLINIC | Age: 65
End: 2020-02-13

## 2020-02-13 NOTE — TELEPHONE ENCOUNTER
pt called and states that for the last 3 days now, she has been getting pain to the r side of her head, behind her ear,  jabbing pain every 20-30 secs  lasts less than 10 secs  pain is about 6-7/10  this is occuring all day and all night long  taking extra strength tylenol and not helping  no hx of headaches or migraines  no other symptoms    please advise  [phone] to leave a detailed message

## 2020-02-13 NOTE — TELEPHONE ENCOUNTER
Spoke with patient who reports 3 day history of intermittent stabbing type pains behind the right ear that last for several seconds and then resolved  She states at times they come intervals of every 20 or 30 seconds  She reports they go away for a while and then come back  She has found that Tylenol has given her some relief but states it takes a while  She notes no other localizing neurologic symptoms associated with this, specifically no vision disturbance, speech difficulty localizing numbness or weakness  Symptoms sound like possible ice pick headache  She may continue to take Tylenol and if her symptoms persist she will call  If she notes worsening symptoms or localizing neurologic symptoms associated with that she will call or go to the emergency room

## 2020-02-17 NOTE — TELEPHONE ENCOUNTER
Called patient, left message on machine, advised to call us back if her symptoms have not resolved yet

## 2020-07-15 ENCOUNTER — TRANSCRIBE ORDERS (OUTPATIENT)
Dept: NEUROLOGY | Facility: CLINIC | Age: 65
End: 2020-07-15

## 2020-07-15 DIAGNOSIS — I63.311 CEREBRAL INFARCTION DUE TO THROMBOSIS OF RIGHT MIDDLE CEREBRAL ARTERY (HCC): Primary | ICD-10-CM

## 2020-07-15 DIAGNOSIS — I63.9 CEREBRAL INFARCTION, UNSPECIFIED (HCC): ICD-10-CM

## 2020-07-16 ENCOUNTER — OFFICE VISIT (OUTPATIENT)
Dept: NEUROLOGY | Facility: CLINIC | Age: 65
End: 2020-07-16
Payer: COMMERCIAL

## 2020-07-16 VITALS
SYSTOLIC BLOOD PRESSURE: 122 MMHG | TEMPERATURE: 98.1 F | DIASTOLIC BLOOD PRESSURE: 68 MMHG | HEART RATE: 67 BPM | BODY MASS INDEX: 40.98 KG/M2 | WEIGHT: 244.4 LBS

## 2020-07-16 DIAGNOSIS — R25.2 MUSCLE CRAMPS: ICD-10-CM

## 2020-07-16 DIAGNOSIS — I63.311 CEREBRAL INFARCTION DUE TO THROMBOSIS OF RIGHT MIDDLE CEREBRAL ARTERY (HCC): Primary | ICD-10-CM

## 2020-07-16 PROCEDURE — 99213 OFFICE O/P EST LOW 20 MIN: CPT | Performed by: PSYCHIATRY & NEUROLOGY

## 2020-07-16 RX ORDER — SIMVASTATIN 40 MG
TABLET ORAL
COMMUNITY
Start: 2019-08-10

## 2020-07-16 RX ORDER — BISOPROLOL FUMARATE AND HYDROCHLOROTHIAZIDE 10; 6.25 MG/1; MG/1
1 TABLET ORAL DAILY
COMMUNITY
Start: 2020-05-03

## 2020-07-16 RX ORDER — FAMOTIDINE 20 MG/1
20 TABLET, FILM COATED ORAL 2 TIMES DAILY
COMMUNITY
Start: 2020-05-05

## 2020-07-16 RX ORDER — ATORVASTATIN CALCIUM 20 MG/1
TABLET, FILM COATED ORAL
COMMUNITY
Start: 2020-07-04

## 2020-07-16 RX ORDER — TIOTROPIUM BROMIDE INHALATION SPRAY 3.12 UG/1
SPRAY, METERED RESPIRATORY (INHALATION)
COMMUNITY
Start: 2020-06-04

## 2020-07-16 NOTE — PROGRESS NOTES
Progress Note - Neurology   Arch Severin 59 y o  female MRN: [id number]  Unit/Bed#:  Encounter: [id number]      Subjective:   Patient is here for a follow-up visit for right MCA CVA which she suffered 2 and half years ago, and was last seen by me 1 year ago, remains on aspirin, lipid-lowering agents, with adequate blood pressure and blood sugar control  Her last blood work was in October of 2019 and was essentially within normal limits  Patient also had a carotid ultrasound last December which showed less than 50% bilateral stenosis  Overall she has been feeling better except over the last 2 weeks describes muscle cramps randomly occurring in any extremity or times along the trunk, does admit she has been taking furosemide but forgets to take her potassium supplements  Patient denies any other neurological symptoms  ROS:   Review of Systems   Constitutional: Negative  Negative for appetite change and fever  HENT: Negative  Negative for hearing loss, tinnitus, trouble swallowing and voice change  Eyes: Negative  Negative for photophobia and pain  Respiratory: Negative  Negative for shortness of breath  Cardiovascular: Negative  Negative for palpitations  Gastrointestinal: Negative  Negative for nausea and vomiting  Endocrine: Negative  Negative for cold intolerance  Genitourinary: Negative  Negative for dysuria, frequency and urgency  Musculoskeletal: Positive for myalgias  Negative for neck pain  Muscle spasms   Skin: Negative  Negative for rash  Neurological: Negative  Negative for dizziness, tremors, seizures, syncope, facial asymmetry, speech difficulty, weakness, light-headedness, numbness and headaches  Hematological: Negative  Does not bruise/bleed easily  Psychiatric/Behavioral: Negative  Negative for confusion, hallucinations and sleep disturbance  MA review of systems was reviewed by myself      Vitals:   Vitals:    07/16/20 0938   Temp: 98 1 °F (36 7 °C)   Weight: 111 kg (244 lb 6 4 oz)   ,Body mass index is 40 98 kg/m²      MEDS:      Current Outpatient Medications:     albuterol (PROVENTIL HFA,VENTOLIN HFA) 90 mcg/act inhaler, Inhale 2 puffs every 6 (six) hours as needed for wheezing, Disp: , Rfl:     aspirin 325 mg tablet, Take 1 tablet by mouth daily, Disp: 30 tablet, Rfl: 0    atorvastatin (LIPITOR) 20 mg tablet, TAKE 1 TABLET BY MOUTH EVERY DAY AT NIGHT, Disp: , Rfl:     Biotin 2 5 MG CAPS, Take 2 capsules by mouth daily, Disp: , Rfl:     bisoprolol-hydrochlorothiazide (ZIAC) 10-6 25 MG per tablet, Take 1 tablet by mouth daily, Disp: , Rfl:     cholecalciferol (VITAMIN D3) 1,000 units tablet, Take 1,000 Units by mouth daily, Disp: , Rfl:     famotidine (PEPCID) 20 mg tablet, Take 20 mg by mouth 2 (two) times a day, Disp: , Rfl:     ferrous sulfate 325 (65 Fe) mg tablet, Take 325 mg by mouth 3 (three) times a week, Disp: , Rfl:     fluticasone (FLONASE) 50 mcg/act nasal spray, 1 spray into each nostril daily as needed , Disp: , Rfl:     furosemide (LASIX) 20 mg tablet, Take 20 mg by mouth daily as needed , Disp: , Rfl:     gemfibrozil (LOPID) 600 mg tablet, Take 600 mg by mouth 2 (two) times a day before meals, Disp: , Rfl:     levothyroxine 125 mcg tablet, Take 125 mcg by mouth daily , Disp: , Rfl:     Liraglutide (VICTOZA) 18 MG/3ML SOPN, Inject 1 8 mg under the skin daily , Disp: , Rfl:     magnesium gluconate (MAGONATE) 500 mg tablet, Take 500 mg by mouth 2 (two) times a day , Disp: , Rfl:     metFORMIN (GLUCOPHAGE) 1000 MG tablet, Take 1,000 mg by mouth 2 (two) times a day with meals, Disp: , Rfl:     montelukast (SINGULAIR) 10 mg tablet, Take 10 mg by mouth daily at bedtime, Disp: , Rfl:     multivitamin (THERAGRAN) TABS, Take 1 tablet by mouth daily, Disp: , Rfl:     potassium chloride (K-DUR,KLOR-CON) 20 mEq tablet, Take 20 mEq by mouth daily, Disp: , Rfl:     ranitidine (ZANTAC) 150 mg tablet, Take 150 mg by mouth 2 (two) times a day, Disp: , Rfl:     simvastatin (ZOCOR) 40 mg tablet, TAKE 1 TABLET BY MOUTH EVERY DAY, Disp: , Rfl:     SPIRIVA RESPIMAT 2 5 MCG/ACT AERS inhaler, INHALE 2 ACTUATION DAILY  , Disp: , Rfl:     WIXELA INHUB 250-50 MCG/DOSE inhaler, Inhale 1 puff 2 (two) times a day, Disp: , Rfl: 3  :    Physical Exam:  General appearance: alert, appears stated age and cooperative  Head: Normocephalic, without obvious abnormality, atraumatic    On examination patient is alert awake oriented, speech is fluent, cranial nerves 2-12 intact, and on motor and sensory exam there is no evidence of any drift, with adequate strength in all 4 extremities, does not extinguish double simultaneous stimuli no sensory loss was noted except in the left hand dorsal aspect as a result of her old CVA, no evidence of any dysmetria was noted and her gait is normal based with no bruits appreciable in the neck  Lab Results: I have personally reviewed pertinent reports  Imaging Studies: I have personally reviewed pertinent reports  Assessment:  1  Right MCA CVA  2  Muscle cramps  Plan:  Patient advised to continue present medications, is familiar with stroke risk factors, has been maintaining herself well on a home exercise program also and is encouraged to take her potassium supplements since hypokalemia could be responsible for her muscle cramps and if she continues to have the same will follow up with her PCP  Patient will now return back to see me only on a p r n  Basis  7/16/2020,9:43 AM    Dictation voice to text software has been used in the creation of this document  Please consider this in light of any contextual or grammatical errors

## 2022-08-11 NOTE — PLAN OF CARE
Impression: Primary open-angle glaucoma, bilateral, mild stage: H40.1131. IOP today: 13/14 mm Hg via [facility]: 
C/D: .45/.4 Angles: Open Gonio: None Tmax:14 /24 mm Hg via [facility] Surgeries: None Plan: Discussed. Patient to continue taking Latanaprost QHS OU. Stable IOP. Will continue to monitor at this time. Problem: PHYSICAL THERAPY ADULT  Goal: Performs mobility at highest level of function for planned discharge setting  See evaluation for individualized goals  Treatment/Interventions: Functional transfer training, LE strengthening/ROM, Elevations, Therapeutic exercise, Endurance training, Patient/family training, Bed mobility, Gait training, Spoke to nursing          See flowsheet documentation for full assessment, interventions and recommendations  Prognosis: Good  Problem List: Decreased strength, Decreased endurance, Impaired balance, Decreased mobility, Impaired sensation, Obesity  Assessment: pt is a 61y/o f who [facility] c L UE numbness  currently undergoing workup for acute CVA  CTB (-) for acute ischemia; MRI noted small foci of acute ischemia R precentral/postcentral gyri  pt pending carotid US  PMH significant for DM 2, HTN, hyperlipidemia, COPD + hypothyroidism  at baseline, pt (I) c functional mobility  resides c son + dtr in law in ranch home c 2 SANDRA  pt on disability + cont to drive  currently presents c deficits in strength, balance, sensation, gait quality + activity tolerance noted in PT exam above  Barthel Index 80/100  noted impaired sensation L UE vs R UE  ambulated distance of 150' c (S) demonstrating gait deviations above  pt would benefit from skilled PT to maximize functional mobility + return to PLOF  upon d/c, anticipate home c no PT needs  pt encouraged to ambulate c nsg staff as tolerated  PT eval of high complexity 2* unstable med status c pt cont to undergo workup for acute CVA; carotid US pending  pt c multiple co-morbidities + decline in mobility from baseline c noted deficits above  resides [place] home c 2 [place]DR[place] + cont to report L UE numbness  Barriers to Discharge: Inaccessible home environment     Recommendation: Home with family support     PT - OK to Discharge: Yes    See flowsheet documentation for full assessment